# Patient Record
Sex: FEMALE | Race: OTHER | NOT HISPANIC OR LATINO | URBAN - METROPOLITAN AREA
[De-identification: names, ages, dates, MRNs, and addresses within clinical notes are randomized per-mention and may not be internally consistent; named-entity substitution may affect disease eponyms.]

---

## 2017-08-12 ENCOUNTER — EMERGENCY (EMERGENCY)
Facility: HOSPITAL | Age: 57
LOS: 1 days | End: 2017-08-12
Attending: EMERGENCY MEDICINE | Admitting: EMERGENCY MEDICINE
Payer: MEDICAID

## 2017-08-12 VITALS
TEMPERATURE: 98 F | HEART RATE: 83 BPM | OXYGEN SATURATION: 100 % | DIASTOLIC BLOOD PRESSURE: 79 MMHG | SYSTOLIC BLOOD PRESSURE: 137 MMHG | RESPIRATION RATE: 20 BRPM

## 2017-08-12 DIAGNOSIS — Z98.890 OTHER SPECIFIED POSTPROCEDURAL STATES: Chronic | ICD-10-CM

## 2017-08-12 LAB
ALBUMIN SERPL ELPH-MCNC: 4.2 G/DL — SIGNIFICANT CHANGE UP (ref 3.3–5)
ALP SERPL-CCNC: 96 U/L — SIGNIFICANT CHANGE UP (ref 40–120)
ALT FLD-CCNC: 55 U/L RC — HIGH (ref 10–45)
ANION GAP SERPL CALC-SCNC: 16 MMOL/L — SIGNIFICANT CHANGE UP (ref 5–17)
APTT BLD: 31.4 SEC — SIGNIFICANT CHANGE UP (ref 27.5–37.4)
AST SERPL-CCNC: 37 U/L — SIGNIFICANT CHANGE UP (ref 10–40)
BASE EXCESS BLDV CALC-SCNC: 2.3 MMOL/L — HIGH (ref -2–2)
BASOPHILS # BLD AUTO: 0.1 K/UL — SIGNIFICANT CHANGE UP (ref 0–0.2)
BASOPHILS NFR BLD AUTO: 0.7 % — SIGNIFICANT CHANGE UP (ref 0–2)
BILIRUB SERPL-MCNC: 1.3 MG/DL — HIGH (ref 0.2–1.2)
BUN SERPL-MCNC: 10 MG/DL — SIGNIFICANT CHANGE UP (ref 7–23)
CA-I SERPL-SCNC: 1.19 MMOL/L — SIGNIFICANT CHANGE UP (ref 1.12–1.3)
CALCIUM SERPL-MCNC: 9.2 MG/DL — SIGNIFICANT CHANGE UP (ref 8.4–10.5)
CHLORIDE BLDV-SCNC: 104 MMOL/L — SIGNIFICANT CHANGE UP (ref 96–108)
CHLORIDE SERPL-SCNC: 101 MMOL/L — SIGNIFICANT CHANGE UP (ref 96–108)
CO2 BLDV-SCNC: 30 MMOL/L — SIGNIFICANT CHANGE UP (ref 22–30)
CO2 SERPL-SCNC: 25 MMOL/L — SIGNIFICANT CHANGE UP (ref 22–31)
CREAT SERPL-MCNC: 0.75 MG/DL — SIGNIFICANT CHANGE UP (ref 0.5–1.3)
EOSINOPHIL # BLD AUTO: 0.1 K/UL — SIGNIFICANT CHANGE UP (ref 0–0.5)
EOSINOPHIL NFR BLD AUTO: 1 % — SIGNIFICANT CHANGE UP (ref 0–6)
GAS PNL BLDV: 140 MMOL/L — SIGNIFICANT CHANGE UP (ref 136–145)
GAS PNL BLDV: SIGNIFICANT CHANGE UP
GLUCOSE BLDV-MCNC: 94 MG/DL — SIGNIFICANT CHANGE UP (ref 70–99)
GLUCOSE SERPL-MCNC: 101 MG/DL — HIGH (ref 70–99)
HCO3 BLDV-SCNC: 29 MMOL/L — SIGNIFICANT CHANGE UP (ref 21–29)
HCT VFR BLD CALC: 39.7 % — SIGNIFICANT CHANGE UP (ref 34.5–45)
HCT VFR BLDA CALC: 43 % — SIGNIFICANT CHANGE UP (ref 39–50)
HGB BLD CALC-MCNC: 14.2 G/DL — SIGNIFICANT CHANGE UP (ref 11.5–15.5)
HGB BLD-MCNC: 13.7 G/DL — SIGNIFICANT CHANGE UP (ref 11.5–15.5)
INR BLD: 1.16 RATIO — SIGNIFICANT CHANGE UP (ref 0.88–1.16)
LACTATE BLDV-MCNC: 1.7 MMOL/L — SIGNIFICANT CHANGE UP (ref 0.7–2)
LYMPHOCYTES # BLD AUTO: 28.9 % — SIGNIFICANT CHANGE UP (ref 13–44)
LYMPHOCYTES # BLD AUTO: 4 K/UL — HIGH (ref 1–3.3)
MCHC RBC-ENTMCNC: 32.1 PG — SIGNIFICANT CHANGE UP (ref 27–34)
MCHC RBC-ENTMCNC: 34.6 GM/DL — SIGNIFICANT CHANGE UP (ref 32–36)
MCV RBC AUTO: 92.8 FL — SIGNIFICANT CHANGE UP (ref 80–100)
MONOCYTES # BLD AUTO: 1.1 K/UL — HIGH (ref 0–0.9)
MONOCYTES NFR BLD AUTO: 7.8 % — SIGNIFICANT CHANGE UP (ref 2–14)
NEUTROPHILS # BLD AUTO: 8.4 K/UL — HIGH (ref 1.8–7.4)
NEUTROPHILS NFR BLD AUTO: 61.5 % — SIGNIFICANT CHANGE UP (ref 43–77)
OTHER CELLS CSF MANUAL: 6 ML/DL — LOW (ref 18–22)
PCO2 BLDV: 53 MMHG — HIGH (ref 35–50)
PH BLDV: 7.35 — SIGNIFICANT CHANGE UP (ref 7.35–7.45)
PLATELET # BLD AUTO: 260 K/UL — SIGNIFICANT CHANGE UP (ref 150–400)
PO2 BLDV: 22 MMHG — LOW (ref 25–45)
POTASSIUM BLDV-SCNC: 3.4 MMOL/L — LOW (ref 3.5–5)
POTASSIUM SERPL-MCNC: 3.5 MMOL/L — SIGNIFICANT CHANGE UP (ref 3.5–5.3)
POTASSIUM SERPL-SCNC: 3.5 MMOL/L — SIGNIFICANT CHANGE UP (ref 3.5–5.3)
PROT SERPL-MCNC: 8.1 G/DL — SIGNIFICANT CHANGE UP (ref 6–8.3)
PROTHROM AB SERPL-ACNC: 12.7 SEC — SIGNIFICANT CHANGE UP (ref 9.8–12.7)
RBC # BLD: 4.27 M/UL — SIGNIFICANT CHANGE UP (ref 3.8–5.2)
RBC # FLD: 11.5 % — SIGNIFICANT CHANGE UP (ref 10.3–14.5)
SAO2 % BLDV: 30 % — LOW (ref 67–88)
SODIUM SERPL-SCNC: 142 MMOL/L — SIGNIFICANT CHANGE UP (ref 135–145)
WBC # BLD: 13.7 K/UL — HIGH (ref 3.8–10.5)
WBC # FLD AUTO: 13.7 K/UL — HIGH (ref 3.8–10.5)

## 2017-08-12 PROCEDURE — 76642 ULTRASOUND BREAST LIMITED: CPT | Mod: 26,LT

## 2017-08-12 PROCEDURE — 99236 HOSP IP/OBS SAME DATE HI 85: CPT

## 2017-08-12 RX ORDER — ACETAMINOPHEN 500 MG
1000 TABLET ORAL ONCE
Qty: 0 | Refills: 0 | Status: COMPLETED | OUTPATIENT
Start: 2017-08-12 | End: 2017-08-12

## 2017-08-12 RX ORDER — SODIUM CHLORIDE 9 MG/ML
1000 INJECTION INTRAMUSCULAR; INTRAVENOUS; SUBCUTANEOUS ONCE
Qty: 0 | Refills: 0 | Status: COMPLETED | OUTPATIENT
Start: 2017-08-12 | End: 2017-08-12

## 2017-08-12 RX ADMIN — Medication 400 MILLIGRAM(S): at 19:50

## 2017-08-12 RX ADMIN — Medication 100 MILLIGRAM(S): at 19:10

## 2017-08-12 RX ADMIN — Medication 1000 MILLIGRAM(S): at 20:27

## 2017-08-12 RX ADMIN — SODIUM CHLORIDE 1000 MILLILITER(S): 9 INJECTION INTRAMUSCULAR; INTRAVENOUS; SUBCUTANEOUS at 19:10

## 2017-08-12 NOTE — ED PROVIDER NOTE - MEDICAL DECISION MAKING DETAILS
56F hx hypothyroidism, bipolar, breast mass currently worked up presents with left breast redness, pain/pressure and draining wound for 1-2 days s/p biopsy 2 weeks.  Staples removed by oncologist Dr. Gonzalez at [facility] last week.  Subjective fevers, recorded 99F.  No antibiotics.  Denies nausea/vomiting.  VSS, afebrile.  Fluctuance mass at approximiately 2-3 o'clock with open wound draining purulence and surrounding erythema.  cultures, abx, u/s, sx consult.  TBA.

## 2017-08-12 NOTE — ED ADULT NURSE NOTE - CHIEF COMPLAINT
The patient is a 56y Female complaining of see chief complaint quote. The patient is a 56y Female complaining of drainage from breast bx site.

## 2017-08-12 NOTE — ED ADULT NURSE NOTE - OBJECTIVE STATEMENT
Pt had bx done of left breast done 2 weeks ago for further eval of abnormal mammogram results.  She c/o subjective fever last night after breast began to drain purulent material, yellow in color.  Site is located @ 2 o'clock position on the breast.

## 2017-08-12 NOTE — ED PROVIDER NOTE - ATTENDING CONTRIBUTION TO CARE
56F hx hypothyroidism, bipolar, breast mass currently worked up presents with left breast redness, pain/pressure and draining wound for 1-2 days s/p biopsy 2 weeks.  Staples removed by oncologist Dr. Gonzalez at [facility] last week.  Subjective fevers, recorded 99F.  No antibiotics.  Denies nausea/vomiting. 56F hx hypothyroidism, bipolar, breast mass currently worked up presents with left breast redness, pain/pressure and draining wound for 1-2 days s/p biopsy 2 weeks.  Staples removed by oncologist Dr. Gonzalez at [facility] last week.  Subjective fevers, recorded 99F.  No antibiotics.  Denies nausea/vomiting.  VSS, afebrile.  Fluctuance mass at approximiately 2-3 o'clock with open wound draining purulence and surrounding erythema.  cultures, abx, u/s, sx consult.  TBA. 56F hx hypothyroidism, bipolar, breast mass currently worked up presents with left breast redness, pain/pressure and draining wound for 1-2 days s/p biopsy 2 weeks.  Staples removed by oncologist Dr. Gonzalez at [facility] last week.  Subjective fevers, recorded 99F.  No antibiotics.  Denies nausea/vomiting.  VSS, afebrile.  Fluctuance mass at approximiately 2-3 o'clock with open wound draining purulence and surrounding erythema.  cultures, abx, u/s, sx consult.

## 2017-08-12 NOTE — ED PROVIDER NOTE - OBJECTIVE STATEMENT
55 y/o female with hx of hypothyroid and depression with recent left breast biopsy 2 weeks ago at [facility] with Dr. Gonzalez who presents for drainage from the wound. saw the surgeon last week to have sutures removed and it looked good at that time. increasing redness and pain since then. started draining purulent material last night.  went to an urgent care who sent her here. has an apt next week with oncology states" its benign but they want me to take tamoxifen". + subjective fever and chills (tmax 99) and nausea without vomiting.

## 2017-08-12 NOTE — ED PROVIDER NOTE - PHYSICAL EXAMINATION
left breast with incision open at both ends, purulent material actively draining with multiple pockets of fluctuance.

## 2017-08-12 NOTE — ED PROVIDER NOTE - PROGRESS NOTE DETAILS
Surgery to drain abscess and bedside.  Pt will be dispositioned to cdu for continued IV clindamycin, observation.  CDU will arrange expedited follow-up with Dr. Gonzalez of [facility].      Chinmay Aguilera MD

## 2017-08-12 NOTE — ED PROVIDER NOTE - CARE PLAN
Principal Discharge DX:	Breast abscess Principal Discharge DX:	Breast abscess  Secondary Diagnosis:	Cellulitis

## 2017-08-13 VITALS
DIASTOLIC BLOOD PRESSURE: 78 MMHG | TEMPERATURE: 98 F | SYSTOLIC BLOOD PRESSURE: 121 MMHG | RESPIRATION RATE: 16 BRPM | HEART RATE: 70 BPM | OXYGEN SATURATION: 99 %

## 2017-08-13 PROCEDURE — 85014 HEMATOCRIT: CPT

## 2017-08-13 PROCEDURE — 99284 EMERGENCY DEPT VISIT MOD MDM: CPT | Mod: 25

## 2017-08-13 PROCEDURE — 85027 COMPLETE CBC AUTOMATED: CPT

## 2017-08-13 PROCEDURE — 96376 TX/PRO/DX INJ SAME DRUG ADON: CPT | Mod: XU

## 2017-08-13 PROCEDURE — 83605 ASSAY OF LACTIC ACID: CPT

## 2017-08-13 PROCEDURE — 87070 CULTURE OTHR SPECIMN AEROBIC: CPT

## 2017-08-13 PROCEDURE — 96374 THER/PROPH/DIAG INJ IV PUSH: CPT | Mod: XU

## 2017-08-13 PROCEDURE — 96375 TX/PRO/DX INJ NEW DRUG ADDON: CPT | Mod: XU

## 2017-08-13 PROCEDURE — 82947 ASSAY GLUCOSE BLOOD QUANT: CPT

## 2017-08-13 PROCEDURE — 10061 I&D ABSCESS COMP/MULTIPLE: CPT

## 2017-08-13 PROCEDURE — 85610 PROTHROMBIN TIME: CPT

## 2017-08-13 PROCEDURE — 87040 BLOOD CULTURE FOR BACTERIA: CPT

## 2017-08-13 PROCEDURE — G0378: CPT

## 2017-08-13 PROCEDURE — 84295 ASSAY OF SERUM SODIUM: CPT

## 2017-08-13 PROCEDURE — 82803 BLOOD GASES ANY COMBINATION: CPT

## 2017-08-13 PROCEDURE — 80053 COMPREHEN METABOLIC PANEL: CPT

## 2017-08-13 PROCEDURE — 85730 THROMBOPLASTIN TIME PARTIAL: CPT

## 2017-08-13 PROCEDURE — 82330 ASSAY OF CALCIUM: CPT

## 2017-08-13 PROCEDURE — 84132 ASSAY OF SERUM POTASSIUM: CPT

## 2017-08-13 PROCEDURE — 82435 ASSAY OF BLOOD CHLORIDE: CPT

## 2017-08-13 PROCEDURE — 76642 ULTRASOUND BREAST LIMITED: CPT

## 2017-08-13 PROCEDURE — 87186 SC STD MICRODIL/AGAR DIL: CPT

## 2017-08-13 RX ORDER — ACETAMINOPHEN 500 MG
650 TABLET ORAL ONCE
Qty: 0 | Refills: 0 | Status: COMPLETED | OUTPATIENT
Start: 2017-08-13 | End: 2017-08-13

## 2017-08-13 RX ORDER — LEVOTHYROXINE SODIUM 125 MCG
100 TABLET ORAL DAILY
Qty: 0 | Refills: 0 | Status: DISCONTINUED | OUTPATIENT
Start: 2017-08-13 | End: 2017-08-16

## 2017-08-13 RX ADMIN — Medication 100 MILLIGRAM(S): at 11:27

## 2017-08-13 RX ADMIN — Medication 650 MILLIGRAM(S): at 08:31

## 2017-08-13 RX ADMIN — Medication 100 MILLIGRAM(S): at 03:20

## 2017-08-13 RX ADMIN — Medication 100 MICROGRAM(S): at 05:22

## 2017-08-13 NOTE — ED CDU PROVIDER NOTE - SKIN WOUND TYPE
abscess(s)/lateral L breast with 4cm horizontal incision packed s/p I&D, minimal drainage and surrounding erythema/INCISION(S) abscess(s)/INCISION(S)/lateral L breast with 4cm horizontal incision, wound packed s/p I&D, minimal drainage noted, + surrounding warmth and erythema

## 2017-08-13 NOTE — ED CDU PROVIDER NOTE - ATTENDING CONTRIBUTION TO CARE
I saw and evaluated patient with ACP. I discussed H+P and MDM with ACP. I agree with the statements made by the ACP unless otherwise noted.

## 2017-08-13 NOTE — ED CDU PROVIDER NOTE - PLAN OF CARE
Stay well hydrated. Change wound packing at least once daily.  You may undress wound to shower and let water run over the wound.   Take Motrin 600mg every 8 hours for pain as needed (take with food). May alternate with Tylenol 650mg every 6-8 hours for pain as needed.   Take clindamycin 300mg every 6 hours for 7 days.   Follow up with your surgeon, Dr. Gonzalez, within 24-48 hours. If you can not follow up, please return to ER within 48 hours for wound check.  Return to ER for worsening pain/swelling, redness, purulent drainage, fever/chills, or any other concerns.

## 2017-08-13 NOTE — ED CDU PROVIDER NOTE - PROGRESS NOTE DETAILS
Patient resting comfortably. Reports pain is controlled at this time. VSS. Pt's two daughter's at bedside confirm that they were told how to re-pack wound and change dressing at least once daily. Understand pt is to f/u with her surgeon and if not within 48 hours, must return to ER for wound check. - Jagruti Munoz PA-C LAURENT Arenas MD: 56F hx hypothyroidism, bipolar, breast mass currently worked up presented last night with left breast redness, pain/pressure and draining wound for 1-2 days s/p biopsy performed 2 weeks ago.  Staples removed by oncologist Dr. Gonzalez at [facility] last week. + Subjective fevers, recorded 99F.  No ABX use. Pt found to have fluctuant mass at approximiately 2-3 o'clock with open wound draining purulence and surrounding erythema. Labs showed WBC 13.7. US demonstrated a fluid collection in the breast concerning for an abscess. Pt is s/p an I+D by Surgery last night in the ED. Per sign-out, pt is to f/u with her Surgeon, Dr. Gonzalez, as outpt. Pt admitted to CDU for IV ABX, further evaluation. Pt is s/p 2 doses of clindamycin. Patient resting in bed comfortably. No distress, no complaints. Vital Signs Stable.  -Kilo George PA-C LAURENT Arenas MD: 56F hx hypothyroidism, bipolar, breast mass currently worked up presented last night with left breast redness, pain/pressure and draining wound for 1-2 days s/p biopsy performed 2 weeks ago.  Staples removed by oncologist Dr. Gonzalez at [facility] last week. + Subjective fevers, recorded 99F.  No ABX use. Pt found to have fluctuant mass at approximiately 2-3 o'clock with open wound draining purulence and surrounding erythema. Labs showed WBC 13.7. US demonstrated a fluid collection in the breast concerning for an abscess. Pt is s/p an I+D by Surgery last night in the ED. Per sign-out, pt is to f/u with her Surgeon, Dr. Gonzalez, as outpt. Pt admitted to CDU for IV ABX, further evaluation. Pt is s/p 2 doses of clindamycin. Exam demonstrates 3cm L breast incision with + packing in place, +3cm diameter area of erythema surrounding it. Per patient, she feels well, pain Is well-controlled by tylenol. Will have surgery re-pack abscess prior to d/c. She will receive 3rd dose of IV clindamycin prior to d/c. Pt knows to f/u with her breast surgeon next week. Will d/c with 1 week of clindamycin and lactobacillus. Patient re-evaluated and doing well.  No acute issues at  this time.  Lab and radiology tests reviewed with patient and/or family.  Patient stable for discharge.

## 2017-08-13 NOTE — ED CDU PROVIDER NOTE - MEDICAL DECISION MAKING DETAILS
56F hx hypothyroidism, bipolar, breast mass currently worked up presents with left breast redness, pain/pressure and draining wound for 1-2 days s/p biopsy 2 weeks. VSS.  subjective fever, L breast abscess on U/S s/p sx I&D at bedside.  CDU for continued IV Clindamycin, observation and arrangement of expedited f/u with Dr. Gonzalez (patient's sx).    Chinmay Aguilera MD

## 2017-08-13 NOTE — ED CDU PROVIDER NOTE - OBJECTIVE STATEMENT
55 y/o female with hx of hypothyroid and depression with recent left breast biopsy 2 weeks ago at [facility] with Dr. Gonzalez who presents for drainage from the wound. saw the surgeon last week to have sutures removed and it looked good at that time. increasing redness and pain since then. started draining purulent material last night.  went to an urgent care who sent her here. has an apt next week with oncology states" its benign but they want me to take tamoxifen". + subjective fever and chills (tmax 99) and nausea without vomiting.  In ED, I&D of abscess performed by surgery. Will admit to CDU for IV abx, pain control, and monitoring 57 y/o female with hx of hypothyroid and depression with recent left breast biopsy 2 weeks ago at [facility] with Dr. Gonzalez who presents for drainage from the wound. saw the surgeon last week to have sutures removed and it looked good at that time. increasing redness and pain since then. started draining purulent material last night.  went to an urgent care who sent her here. has an apt next week with oncology states" its benign but they want me to take tamoxifen". + subjective fever and chills (tmax 99) and nausea without vomiting.  In ED, I&D of abscess performed by surgery. Will admit to CDU for IV abx, pain control, and monitoring.

## 2017-08-13 NOTE — CONSULT NOTE ADULT - ASSESSMENT
Patient is a 55yo F s/p bedside I&D of L breast abscess that presented 2 weeks after an excisional bx with outside surgeon.    -Discharge home with 7 day course of antibiotics  -Patient is to follow up with her Surgeon on Monday or come to the ED for assessment of wound.    -Daughters to change the packing at least once a day.  Patient instructed to shower and let water run over the wound.  Patient also told to come back to the ED if wound becomes more erythematous, more purulent, or she develops fevers and chills  -Discussed with Dr. Amish Waller MD PGYII

## 2017-08-13 NOTE — CONSULT NOTE ADULT - SUBJECTIVE AND OBJECTIVE BOX
GENERAL SURGERY CONSULT NOTE     CC: 56y old Female admitted with a chief complaint of L breast pain and purulent drainage    HPI: This patient is a 56y old Female who, 2 wks ago, has a mammo loc excisional biopsy at OSH.  Patient has had pain swelling and skin redness at the incision with purulent drainage spontaneously from the lateral border of her incision.      U/S confirmed that there was a < from: US Breast Limited, Left (08.12.17 @ 18:18) >   7.3 x 2.9 x 4.3 cm ill-defined fluid collection in the left breast with peripheral hyperemia.      Procedure Proceedings:  Dr. Gio Ga and Dr. Yuliya Waller obtained consent for the incision and drainage of breast abscess.  Patient was prepped with Chlorhexidine and anesthetized with 20cc of 1% Lidocaine with Epinephrine.  sharp dissection of incision and underlying Vicryl sutures at the lateral 1/4 of the incision.  In total, about 50cc of purulent drainage was expressed.  Medial incision opened with expression of pus, so the entire incision length was extended.  Patient's daughters were taught how to pack the wound and were explained the importance.  The wound was propperly dressed and packed after irrigation with 20cc of sterile water.          PMHx/PSHx:  Breast bx  hypothyroidism  Depression      Medications (home): Medications (inpatient): clindamycin IVPB 600 milliGRAM(s) IV Intermittent once  clindamycin IVPB 600 milliGRAM(s) IV Intermittent every 8 hours  levothyroxine 100 MICROGram(s) Oral daily    Medications (PRN):  Allergies    No Known Allergies    Intolerances      Social Hx:     Physical Exam  T(C): 36.8 (08-13-17 @ 02:09), Max: 36.9 (08-12-17 @ 15:20)  HR: 77 (08-13-17 @ 02:09) (73 - 83)  BP: 117/80 (08-13-17 @ 02:09) (113/79 - 137/79)  RR: 17 (08-13-17 @ 02:09) (17 - 20)  SpO2: 95% (08-13-17 @ 02:09) (95% - 100%)  Wt(kg): --  Tmax: T(C): , Max: 36.9 (08-12-17 @ 15:20)  Wt(kg): --    08-12-17  -  08-13-17  --------------------------------------------------------  IN:    IV PiggyBack: 200 mL    Sodium Chloride 0.9% IV Bolus: 1000 mL  Total IN: 1200 mL    OUT:  Total OUT: 0 mL    Total NET: 1200 mL        General: NAD  Neuro: alert and oriented, no focal deficits, moves all extremities spontaneously  HEENT: NCAT, EOMI, anicteric, mucosa moist  Respiratory: airway patent, respirations unlabored  L breast, incision with erythematous skin edges, warm to the touch, tender, with purulent drainage at later aspect where most fluctuance was appreciated  Extremities: no edema, sensation and movement grossly intact  Skin: warm, dry, appropriate color    Labs:                        13.7   13.7  )-----------( 260      ( 12 Aug 2017 17:56 )             39.7     PT/INR - ( 12 Aug 2017 17:56 )   PT: 12.7 sec;   INR: 1.16 ratio         PTT - ( 12 Aug 2017 17:56 )  PTT:31.4 sec  08-12    142  |  101  |  10  ----------------------------<  101<H>  3.5   |  25  |  0.75    Ca    9.2      12 Aug 2017 17:56    TPro  8.1  /  Alb  4.2  /  TBili  1.3<H>  /  DBili  x   /  AST  37  /  ALT  55<H>  /  AlkPhos  96  08-12

## 2017-08-13 NOTE — ED ADULT NURSE REASSESSMENT NOTE - NS ED NURSE REASSESS COMMENT FT1
report taken from Corry ZIMMERMAN
Received pt from ELSIE Fowler , received pt alert and responsive, oriented x4, denies any respiratory distress, SOB, or difficulty breathing. Pt transferred to CDU for observation for L breast abscess, cellulitis,+ redness, swelling to L breast post breast biopsy 2 weeks ago, pt denies pain at this time, pending clindamycin 600mg IV at 0300. IV in place, patent and free of signs of infiltration, V/S stable, pt afebrile, Pt educated on unit and unit rules, instructed patient to notify RN of any needed assistance, Pt verbalizes understanding, Call bell placed within reach. Safety maintained. Will continue to monitor. Daughters at bedside.

## 2017-08-14 LAB
-  AMIKACIN: SIGNIFICANT CHANGE UP
-  AMPICILLIN/SULBACTAM: SIGNIFICANT CHANGE UP
-  AMPICILLIN: SIGNIFICANT CHANGE UP
-  AZTREONAM: SIGNIFICANT CHANGE UP
-  CEFAZOLIN: SIGNIFICANT CHANGE UP
-  CEFEPIME: SIGNIFICANT CHANGE UP
-  CEFOXITIN: SIGNIFICANT CHANGE UP
-  CEFTAZIDIME: SIGNIFICANT CHANGE UP
-  CEFTRIAXONE: SIGNIFICANT CHANGE UP
-  CIPROFLOXACIN: SIGNIFICANT CHANGE UP
-  ERTAPENEM: SIGNIFICANT CHANGE UP
-  GENTAMICIN: SIGNIFICANT CHANGE UP
-  IMIPENEM: SIGNIFICANT CHANGE UP
-  LEVOFLOXACIN: SIGNIFICANT CHANGE UP
-  MEROPENEM: SIGNIFICANT CHANGE UP
-  PIPERACILLIN/TAZOBACTAM: SIGNIFICANT CHANGE UP
-  TOBRAMYCIN: SIGNIFICANT CHANGE UP
-  TRIMETHOPRIM/SULFAMETHOXAZOLE: SIGNIFICANT CHANGE UP
CULTURE RESULTS: SIGNIFICANT CHANGE UP
METHOD TYPE: SIGNIFICANT CHANGE UP
ORGANISM # SPEC MICROSCOPIC CNT: SIGNIFICANT CHANGE UP
ORGANISM # SPEC MICROSCOPIC CNT: SIGNIFICANT CHANGE UP
SPECIMEN SOURCE: SIGNIFICANT CHANGE UP

## 2017-08-15 NOTE — ED POST DISCHARGE NOTE - OTHER COMMUNICATION
Patient called back, informed of culture results and sent rx for Bactrim to patients pharmacy. Cali Merlos PA-C.

## 2017-08-15 NOTE — ED POST DISCHARGE NOTE - DETAILS
KALIN left for callback -Tammy ZAPATA VM left for callback - Eber ALDANA KALIN left for callback -Slowey DO 8/17

## 2017-08-17 LAB
CULTURE RESULTS: SIGNIFICANT CHANGE UP
CULTURE RESULTS: SIGNIFICANT CHANGE UP
SPECIMEN SOURCE: SIGNIFICANT CHANGE UP
SPECIMEN SOURCE: SIGNIFICANT CHANGE UP

## 2017-08-17 RX ORDER — AZTREONAM 2 G
1 VIAL (EA) INJECTION
Qty: 20 | Refills: 0 | OUTPATIENT
Start: 2017-08-17 | End: 2017-08-27

## 2018-02-18 ENCOUNTER — INPATIENT (INPATIENT)
Facility: HOSPITAL | Age: 58
LOS: 23 days | Discharge: HOME | End: 2018-03-14
Attending: PSYCHIATRY & NEUROLOGY | Admitting: PSYCHIATRY & NEUROLOGY
Payer: MEDICAID

## 2018-02-18 VITALS
SYSTOLIC BLOOD PRESSURE: 124 MMHG | TEMPERATURE: 99 F | HEART RATE: 120 BPM | OXYGEN SATURATION: 100 % | RESPIRATION RATE: 16 BRPM | DIASTOLIC BLOOD PRESSURE: 67 MMHG

## 2018-02-18 DIAGNOSIS — F31.9 BIPOLAR DISORDER, UNSPECIFIED: ICD-10-CM

## 2018-02-18 DIAGNOSIS — Z98.890 OTHER SPECIFIED POSTPROCEDURAL STATES: Chronic | ICD-10-CM

## 2018-02-18 DIAGNOSIS — F31.60 BIPOLAR DISORDER, CURRENT EPISODE MIXED, UNSPECIFIED: ICD-10-CM

## 2018-02-18 LAB
ALBUMIN SERPL ELPH-MCNC: 4.3 G/DL — SIGNIFICANT CHANGE UP (ref 3.3–5)
ALP SERPL-CCNC: 68 U/L — SIGNIFICANT CHANGE UP (ref 40–120)
ALT FLD-CCNC: 52 U/L — HIGH (ref 4–33)
APAP SERPL-MCNC: < 15 UG/ML — LOW (ref 15–25)
AST SERPL-CCNC: 80 U/L — HIGH (ref 4–32)
BARBITURATES MEASUREMENT: NEGATIVE — SIGNIFICANT CHANGE UP
BASOPHILS # BLD AUTO: 0.05 K/UL — SIGNIFICANT CHANGE UP (ref 0–0.2)
BASOPHILS NFR BLD AUTO: 0.4 % — SIGNIFICANT CHANGE UP (ref 0–2)
BENZODIAZ SERPL-MCNC: NEGATIVE — SIGNIFICANT CHANGE UP
BILIRUB SERPL-MCNC: 0.7 MG/DL — SIGNIFICANT CHANGE UP (ref 0.2–1.2)
BUN SERPL-MCNC: 13 MG/DL — SIGNIFICANT CHANGE UP (ref 7–23)
CALCIUM SERPL-MCNC: 8.7 MG/DL — SIGNIFICANT CHANGE UP (ref 8.4–10.5)
CHLORIDE SERPL-SCNC: 104 MMOL/L — SIGNIFICANT CHANGE UP (ref 98–107)
CO2 SERPL-SCNC: 24 MMOL/L — SIGNIFICANT CHANGE UP (ref 22–31)
CREAT SERPL-MCNC: 0.75 MG/DL — SIGNIFICANT CHANGE UP (ref 0.5–1.3)
EOSINOPHIL # BLD AUTO: 0.11 K/UL — SIGNIFICANT CHANGE UP (ref 0–0.5)
EOSINOPHIL NFR BLD AUTO: 0.9 % — SIGNIFICANT CHANGE UP (ref 0–6)
ETHANOL BLD-MCNC: < 10 MG/DL — SIGNIFICANT CHANGE UP
GLUCOSE SERPL-MCNC: 97 MG/DL — SIGNIFICANT CHANGE UP (ref 70–99)
HCT VFR BLD CALC: 35.2 % — SIGNIFICANT CHANGE UP (ref 34.5–45)
HGB BLD-MCNC: 11.5 G/DL — SIGNIFICANT CHANGE UP (ref 11.5–15.5)
IMM GRANULOCYTES # BLD AUTO: 0.04 # — SIGNIFICANT CHANGE UP
IMM GRANULOCYTES NFR BLD AUTO: 0.3 % — SIGNIFICANT CHANGE UP (ref 0–1.5)
LYMPHOCYTES # BLD AUTO: 33.9 % — SIGNIFICANT CHANGE UP (ref 13–44)
LYMPHOCYTES # BLD AUTO: 4.2 K/UL — HIGH (ref 1–3.3)
MCHC RBC-ENTMCNC: 29.8 PG — SIGNIFICANT CHANGE UP (ref 27–34)
MCHC RBC-ENTMCNC: 32.7 % — SIGNIFICANT CHANGE UP (ref 32–36)
MCV RBC AUTO: 91.2 FL — SIGNIFICANT CHANGE UP (ref 80–100)
MONOCYTES # BLD AUTO: 0.87 K/UL — SIGNIFICANT CHANGE UP (ref 0–0.9)
MONOCYTES NFR BLD AUTO: 7 % — SIGNIFICANT CHANGE UP (ref 2–14)
NEUTROPHILS # BLD AUTO: 7.11 K/UL — SIGNIFICANT CHANGE UP (ref 1.8–7.4)
NEUTROPHILS NFR BLD AUTO: 57.5 % — SIGNIFICANT CHANGE UP (ref 43–77)
NRBC # FLD: 0 — SIGNIFICANT CHANGE UP
PLATELET # BLD AUTO: 285 K/UL — SIGNIFICANT CHANGE UP (ref 150–400)
PMV BLD: 10 FL — SIGNIFICANT CHANGE UP (ref 7–13)
POTASSIUM SERPL-MCNC: 3.6 MMOL/L — SIGNIFICANT CHANGE UP (ref 3.5–5.3)
POTASSIUM SERPL-SCNC: 3.6 MMOL/L — SIGNIFICANT CHANGE UP (ref 3.5–5.3)
PROT SERPL-MCNC: 7.4 G/DL — SIGNIFICANT CHANGE UP (ref 6–8.3)
RBC # BLD: 3.86 M/UL — SIGNIFICANT CHANGE UP (ref 3.8–5.2)
RBC # FLD: 12.4 % — SIGNIFICANT CHANGE UP (ref 10.3–14.5)
SALICYLATES SERPL-MCNC: < 5 MG/DL — LOW (ref 15–30)
SODIUM SERPL-SCNC: 142 MMOL/L — SIGNIFICANT CHANGE UP (ref 135–145)
TSH SERPL-MCNC: 1.26 UIU/ML — SIGNIFICANT CHANGE UP (ref 0.27–4.2)
WBC # BLD: 12.38 K/UL — HIGH (ref 3.8–10.5)
WBC # FLD AUTO: 12.38 K/UL — HIGH (ref 3.8–10.5)

## 2018-02-18 PROCEDURE — 70450 CT HEAD/BRAIN W/O DYE: CPT | Mod: 26

## 2018-02-18 PROCEDURE — 99285 EMERGENCY DEPT VISIT HI MDM: CPT

## 2018-02-18 RX ORDER — ASPIRIN/CALCIUM CARB/MAGNESIUM 324 MG
81 TABLET ORAL DAILY
Qty: 0 | Refills: 0 | Status: DISCONTINUED | OUTPATIENT
Start: 2018-02-18 | End: 2018-03-14

## 2018-02-18 RX ORDER — BENZTROPINE MESYLATE 1 MG
0.5 TABLET ORAL AT BEDTIME
Qty: 0 | Refills: 0 | Status: DISCONTINUED | OUTPATIENT
Start: 2018-02-18 | End: 2018-02-23

## 2018-02-18 RX ORDER — BENZOCAINE AND MENTHOL 5; 1 G/100ML; G/100ML
1 LIQUID ORAL
Qty: 0 | Refills: 0 | Status: DISCONTINUED | OUTPATIENT
Start: 2018-02-18 | End: 2018-03-14

## 2018-02-18 RX ORDER — ACETAMINOPHEN 500 MG
650 TABLET ORAL ONCE
Qty: 0 | Refills: 0 | Status: COMPLETED | OUTPATIENT
Start: 2018-02-18 | End: 2018-02-18

## 2018-02-18 RX ORDER — HALOPERIDOL DECANOATE 100 MG/ML
2 INJECTION INTRAMUSCULAR EVERY 6 HOURS
Qty: 0 | Refills: 0 | Status: DISCONTINUED | OUTPATIENT
Start: 2018-02-18 | End: 2018-03-14

## 2018-02-18 RX ORDER — RISPERIDONE 4 MG/1
2 TABLET ORAL AT BEDTIME
Qty: 0 | Refills: 0 | Status: DISCONTINUED | OUTPATIENT
Start: 2018-02-18 | End: 2018-02-22

## 2018-02-18 RX ORDER — HALOPERIDOL DECANOATE 100 MG/ML
2 INJECTION INTRAMUSCULAR ONCE
Qty: 0 | Refills: 0 | Status: DISCONTINUED | OUTPATIENT
Start: 2018-02-18 | End: 2018-03-14

## 2018-02-18 RX ORDER — LEVOTHYROXINE SODIUM 125 MCG
75 TABLET ORAL DAILY
Qty: 0 | Refills: 0 | Status: DISCONTINUED | OUTPATIENT
Start: 2018-02-18 | End: 2018-03-14

## 2018-02-18 RX ADMIN — Medication 650 MILLIGRAM(S): at 22:54

## 2018-02-18 RX ADMIN — Medication 650 MILLIGRAM(S): at 21:54

## 2018-02-18 RX ADMIN — Medication 2 MILLIGRAM(S): at 16:19

## 2018-02-18 RX ADMIN — BENZOCAINE AND MENTHOL 1 LOZENGE: 5; 1 LIQUID ORAL at 21:54

## 2018-02-18 NOTE — ED BEHAVIORAL HEALTH ASSESSMENT NOTE - SUMMARY
56 y/o f english speaking, domiciled, unemployed with PPHx of Bipolar non-compliant with risperidone + Valproic Acid, 2 past admission last around 2015, no substance abuse PMHx of Hypothyroidism, Breast Biopsy that was BIB family for 2 days insomnia, pressured speech, disorganized thinking and irritability.   The patient's affect is not congruent to content and is complicated by psychosis and some degrees of alteration of alteration of sensorium but without waxing and waning.  The patient has no insight into her state of mind, and provides inconstit history, but based on collateral it is highly likely she is in a bipolar mix or manic state secondary to medication non-compliance. The precipitating events and time course appear appropriate for a psychiatric cause and are not solely attributable to medical cause but there is low concern for metastatic cancer or UTI.  They do appear to internally preoccupied, and has significant cognitive impairment or have a delusion that would limit their ability to care of self.   The patient does not have insight into this and will require to be hospitalized involuntarily.   They do not have the capacity to make decision about their discharge from the hospital, which is unlikely to change within the next few hours. 56 y/o f english speaking, domiciled, unemployed with PPHx of Bipolar non-compliant with risperidone + Valproic Acid, 2 past admission last around 2015, no substance abuse PMHx of Hypothyroidism, Breast Biopsy that was BIB family for 2 days insomnia, pressured speech, disorganized thinking and irritability.   The patient's affect is not congruent to content and is complicated by psychosis and some degrees of alteration of alteration of sensorium but without waxing and waning.  The patient has no insight into her state of mind, and provides minimal history but has delusional content + disorganized thinking, and grandiosity, and combined with collateral it is highly likely she is in a bipolar mix or manic state secondary to medication non-compliance from bipolar disorder. The precipitating events and time course appear appropriate for a psychiatric cause and are not solely attributable to medical cause but there is low concern for metastatic cancer or UTI.  They do appear to internally preoccupied, and has significant cognitive impairment or have a delusions that would limit their ability to care of self.   The patient does not have insight into this and will require to be hospitalized involuntarily.   They do not have the capacity to make decision about their discharge from the hospital, which is unlikely to change within the next few hours.

## 2018-02-18 NOTE — ED PROVIDER NOTE - OBJECTIVE STATEMENT
56 y/o F hx Bipolar, Breast Cancer, GERD, Hypothyroid    BIB family w c/o agitation and bizarre behaviour secondary to medication non compliance . Denies SI/HI/AH/VH.  Denies falling,  punching or kicking any objects. Denies pain, SOB, fever, chills, chest/abdominal discomfort. Denies use of alcohol or illicit drugs.

## 2018-02-18 NOTE — ED BEHAVIORAL HEALTH ASSESSMENT NOTE - AFFECT RANGE
GENERAL: + chills, EYES: no change in vision, HEENT: no trouble swallowing or speaking, CARDIAC: no chest pain, PULMONARY: no cough or SOB, GI: +abdominal pain, no nausea or no vomiting, +constipation, : No changes in urination, SKIN: no rashes, NEURO: no headache,   otherwise as HPI or negative Labile

## 2018-02-18 NOTE — ED BEHAVIORAL HEALTH ASSESSMENT NOTE - OTHER PAST PSYCHIATRIC HISTORY (INCLUDE DETAILS REGARDING ONSET, COURSE OF ILLNESS, INPATIENT/OUTPATIENT TREATMENT)
Patient kimber. No SA, two admission, last 2015. History of Geodon, and inactive outpatient treatment at [facility].

## 2018-02-18 NOTE — ED ADULT TRIAGE NOTE - CHIEF COMPLAINT QUOTE
as per daughter, pt has hx bipolar and having a manic episode. non complaint with meds. denies any HI/SI

## 2018-02-18 NOTE — ED BEHAVIORAL HEALTH ASSESSMENT NOTE - CASE SUMMARY
58 y/o f english speaking, domiciled, unemployed with PPHx of Bipolar non-compliant with risperidone + Valproic Acid, 2 past admission last around 2015, no substance abuse PMHx of Hypothyroidism, Breast Biopsy that was BIB family for 2 days insomnia, pressured speech, disorganized thinking and irritability.   The patient's affect is not congruent to content and is complicated by psychosis and some degrees of alteration of alteration of sensorium but without waxing and waning.  The patient has no insight into her state of mind, and provides minimal history but has delusional content + disorganized thinking, and grandiosity, and combined with collateral it is highly likely she is in a bipolar mix or manic state secondary to medication non-compliance from bipolar disorder. The precipitating events and time course appear appropriate for a psychiatric cause and are not solely attributable to medical cause but there is low concern for metastatic cancer or UTI.  They do appear to internally preoccupied, and has significant cognitive impairment or have a delusions that would limit their ability to care of self.   The patient does not have insight into this and will require to be hospitalized involuntarily.   They do not have the capacity to make decision about their discharge from the hospital, which is unlikely to change within the next few hours.  Agree with assessment and plan as outline above. Pt will require inpatient psychiatric admission for safety and stabilization.
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
## 2018-02-18 NOTE — ED BEHAVIORAL HEALTH ASSESSMENT NOTE - HPI (INCLUDE ILLNESS QUALITY, SEVERITY, DURATION, TIMING, CONTEXT, MODIFYING FACTORS, ASSOCIATED SIGNS AND SYMPTOMS)
58 y/o f english speaking, domiciled, unemployed with PPHx of Bipolar non-compliant with risperidone + Valproic Acid, 2 past admission last around 2015, no substance abuse PMHx of Hypothyroidism, Breast Biopsy that was BIB family for 2 days insomnia, pressured speech, disorganized thinking and irritability.      History obtained from patient which appears unreliable, and EMR records.  Patient unknown to writer.   No CVM records.  Istop reviewed with reference #: [id number] no rx found.    The patient reports “I am from [facility]”, she repeats I have four children and explains that her daughter in [place] and is a MD, but collateral confirms she is a .  She reports “I brought my son here” and kmiber needing any care.  She kimber having a problem with sleep, and reports “I sleep just fine”.   She is often refuses to answer questions.      No suicidal ideals/intention/plan.   No audio/visual/tactile/olfactory/gustatory hallucinations.   No homicidal ideals.     Per collateral from family (son and daughter) patient has a history of Bipolar and has had two prior admission, 1st about 1 years ago and one 2015.  He has been non-compliant with medications and is followed at [facility] for psychiatric care by  .  Over the last months or so she stopped taking medications, and has been spending $500 at the dollar store. Over the last two days, she hasn’t been sleeping, packing, irritably. The family is hoping to have the patient admitted. 58 y/o f english speaking, domiciled, unemployed with PPHx of Bipolar non-compliant with risperidone + Valproic Acid, 2 past admissions last around 2015, no substance abuse PMHx of Hypothyroidism, Breast Biopsy that was BIB family for 2 days of insomnia, pressured speech, disorganized thinking and irritability.      History obtained from patient which appears unreliable, and EMR records.  Patient unknown to writer.   No CVM records.  Istop reviewed with reference #: [id number] no rx found.    The patient reports “I am from [facility]”, she repeats I have four children and explains that her daughter in [place] and is a MD, but collateral confirms she is a .  She reports “I brought my son here” and kimber needing any care.  She kimber having a problem with sleep, and reports “I sleep just fine”.   She is often refuses to answer questions.     No suicidal ideals/intention/plan.   No audio/visual/tactile/olfactory/gustatory hallucinations.   No homicidal ideals.     Per collateral from family (son and daughter) patient has a history of Bipolar and has had two prior admission, 1st about 10 years ago and one in 2015. Recently lost her  two years ago. He has been non-compliant with medications for about 1 month and is followed at [facility] for psychiatric care by Dr. Cooper.  Over the last months or so she has been refusing medications, and has been spending $500 at the dollar store, and hoarding purchases at home. Over the last two days, she hasn’t been sleeping, packing, irritably. The family is hoping to have the patient admitted.  At baseline is mildly depressed.

## 2018-02-18 NOTE — ED BEHAVIORAL HEALTH ASSESSMENT NOTE - CURRENT MEDICATION
Tamoxifen  Levothyroxine risperidone 3mg QHS  valproic acid 750 mg daily  tamoxifen 20mg daily   synthroid 75 mcg daily   cogentin 0.5mg qhs

## 2018-02-18 NOTE — ED BEHAVIORAL HEALTH ASSESSMENT NOTE - DESCRIPTION
Hypothyroid, Brest Cancer with resection Has four children, unable to provide much more details.  States she is Latter-day, and agynes and reports being a ventraininari. In ED patient’s vitals were stable, , EKG QTc 469. Hypothyroidism, Brest biopsy with concern for cancern Has four children, unable to provide much more details.  States she is [employer], and Gyanness and reports being a .

## 2018-02-18 NOTE — ED BEHAVIORAL HEALTH ASSESSMENT NOTE - PSYCHIATRIC ISSUES AND PLAN (INCLUDE STANDING AND PRN MEDICATION)
Involuntary Admission under 9.39 to Low 4; Admission orders; No SI/HI therefore no constant observation (1:1) for safety; PRN agitation medications; CTH w/o contrast and medical clearance prior to admission. Involuntary Admission under 9.39 to Low 4; Admission orders; No SI/HI therefore no constant observation (1:1) for safety; PRN agitation medications; CTH w/o contrast and medical clearance prior to admission; hold valproic secondary to mild transaminitis, slow titration for risperidone secondary to elevated QTc.

## 2018-02-18 NOTE — ED BEHAVIORAL HEALTH ASSESSMENT NOTE - SUICIDE RISK FACTORS
Mood episode/Command hallucinations to hurt self Mood episode/Command hallucinations to hurt self/Highly impulsive behavior

## 2018-02-18 NOTE — ED PROVIDER NOTE - MEDICAL DECISION MAKING DETAILS
58 y/o F hx Bipolar, Breast Cancer, GERD, Hypothyroid   Labs, Urine Tox/UA, EKG. CT head r/o mets  Medical evaluation performed. There is no clinical evidence of intoxication or any acute medical problem requiring immediate intervention. Patient is awaiting psychiatric consultation. Final disposition will be determined by psychiatrist.

## 2018-02-19 LAB
APPEARANCE UR: CLEAR — SIGNIFICANT CHANGE UP
BACTERIA # UR AUTO: SIGNIFICANT CHANGE UP
BILIRUB UR-MCNC: NEGATIVE — SIGNIFICANT CHANGE UP
BLOOD UR QL VISUAL: NEGATIVE — SIGNIFICANT CHANGE UP
CHOLEST SERPL-MCNC: 206 MG/DL — HIGH (ref 120–199)
COLOR SPEC: YELLOW — SIGNIFICANT CHANGE UP
GLUCOSE UR-MCNC: NEGATIVE — SIGNIFICANT CHANGE UP
HDLC SERPL-MCNC: 87 MG/DL — HIGH (ref 45–65)
HYALINE CASTS # UR AUTO: SIGNIFICANT CHANGE UP (ref 0–?)
KETONES UR-MCNC: NEGATIVE — SIGNIFICANT CHANGE UP
LEUKOCYTE ESTERASE UR-ACNC: HIGH
LIPID PNL WITH DIRECT LDL SERPL: 112 MG/DL — SIGNIFICANT CHANGE UP
MUCOUS THREADS # UR AUTO: SIGNIFICANT CHANGE UP
NITRITE UR-MCNC: NEGATIVE — SIGNIFICANT CHANGE UP
NON-SQ EPI CELLS # UR AUTO: <1 — SIGNIFICANT CHANGE UP
PH UR: 6.5 — SIGNIFICANT CHANGE UP (ref 4.6–8)
PROT UR-MCNC: 20 MG/DL — SIGNIFICANT CHANGE UP
RBC CASTS # UR COMP ASSIST: SIGNIFICANT CHANGE UP (ref 0–?)
SP GR SPEC: 1.03 — SIGNIFICANT CHANGE UP (ref 1–1.04)
SQUAMOUS # UR AUTO: SIGNIFICANT CHANGE UP
TRIGL SERPL-MCNC: 57 MG/DL — SIGNIFICANT CHANGE UP (ref 10–149)
UROBILINOGEN FLD QL: 8 MG/DL — HIGH
WBC UR QL: SIGNIFICANT CHANGE UP (ref 0–?)

## 2018-02-19 PROCEDURE — 99223 1ST HOSP IP/OBS HIGH 75: CPT

## 2018-02-19 RX ORDER — TAMOXIFEN CITRATE 20 MG/1
20 TABLET, FILM COATED ORAL ONCE
Qty: 0 | Refills: 0 | Status: DISCONTINUED | OUTPATIENT
Start: 2018-02-19 | End: 2018-02-19

## 2018-02-19 RX ORDER — ACETAMINOPHEN 500 MG
650 TABLET ORAL EVERY 6 HOURS
Qty: 0 | Refills: 0 | Status: DISCONTINUED | OUTPATIENT
Start: 2018-02-19 | End: 2018-03-14

## 2018-02-19 RX ADMIN — Medication 650 MILLIGRAM(S): at 15:29

## 2018-02-19 RX ADMIN — RISPERIDONE 2 MILLIGRAM(S): 4 TABLET ORAL at 20:15

## 2018-02-19 RX ADMIN — Medication 0.5 MILLIGRAM(S): at 20:15

## 2018-02-19 RX ADMIN — Medication 75 MICROGRAM(S): at 09:08

## 2018-02-19 RX ADMIN — Medication 81 MILLIGRAM(S): at 09:08

## 2018-02-20 LAB
ALBUMIN SERPL ELPH-MCNC: 4 G/DL — SIGNIFICANT CHANGE UP (ref 3.3–5)
ALP SERPL-CCNC: 63 U/L — SIGNIFICANT CHANGE UP (ref 40–120)
ALT FLD-CCNC: 44 U/L — HIGH (ref 4–33)
AST SERPL-CCNC: 50 U/L — HIGH (ref 4–32)
BILIRUB DIRECT SERPL-MCNC: 0.1 MG/DL — SIGNIFICANT CHANGE UP (ref 0.1–0.2)
BILIRUB SERPL-MCNC: 0.3 MG/DL — SIGNIFICANT CHANGE UP (ref 0.2–1.2)
PROT SERPL-MCNC: 7 G/DL — SIGNIFICANT CHANGE UP (ref 6–8.3)

## 2018-02-20 RX ADMIN — Medication 2 MILLIGRAM(S): at 04:01

## 2018-02-20 RX ADMIN — RISPERIDONE 2 MILLIGRAM(S): 4 TABLET ORAL at 20:32

## 2018-02-20 RX ADMIN — Medication 0.5 MILLIGRAM(S): at 20:32

## 2018-02-20 RX ADMIN — Medication 650 MILLIGRAM(S): at 17:26

## 2018-02-20 RX ADMIN — Medication 75 MICROGRAM(S): at 08:10

## 2018-02-20 RX ADMIN — Medication 81 MILLIGRAM(S): at 08:10

## 2018-02-20 RX ADMIN — Medication 2 MILLIGRAM(S): at 20:32

## 2018-02-20 NOTE — ED BEHAVIORAL HEALTH NOTE - BEHAVIORAL HEALTH NOTE
Worker obtained precertification for patient and spoke to [employer] ([phone]) and received authorized days of 2/18/18-2/21/18 with date of review on 2/21/18 with [employer] ([phone]), authorization # [id number].

## 2018-02-21 PROCEDURE — 99232 SBSQ HOSP IP/OBS MODERATE 35: CPT

## 2018-02-21 RX ADMIN — Medication 75 MICROGRAM(S): at 09:15

## 2018-02-21 RX ADMIN — Medication 2 MILLIGRAM(S): at 09:15

## 2018-02-21 RX ADMIN — Medication 81 MILLIGRAM(S): at 09:15

## 2018-02-21 RX ADMIN — Medication 2 MILLIGRAM(S): at 21:41

## 2018-02-21 RX ADMIN — Medication 0.5 MILLIGRAM(S): at 21:41

## 2018-02-21 RX ADMIN — RISPERIDONE 2 MILLIGRAM(S): 4 TABLET ORAL at 21:41

## 2018-02-22 RX ORDER — TAMOXIFEN CITRATE 20 MG/1
20 TABLET, FILM COATED ORAL DAILY
Qty: 0 | Refills: 0 | Status: DISCONTINUED | OUTPATIENT
Start: 2018-02-22 | End: 2018-03-14

## 2018-02-22 RX ORDER — TAMOXIFEN CITRATE 20 MG/1
20 TABLET, FILM COATED ORAL ONCE
Qty: 0 | Refills: 0 | Status: COMPLETED | OUTPATIENT
Start: 2018-02-22 | End: 2018-02-22

## 2018-02-22 RX ORDER — OLANZAPINE 15 MG/1
5 TABLET, FILM COATED ORAL
Qty: 0 | Refills: 0 | Status: DISCONTINUED | OUTPATIENT
Start: 2018-02-22 | End: 2018-02-26

## 2018-02-22 RX ADMIN — Medication 650 MILLIGRAM(S): at 21:44

## 2018-02-22 RX ADMIN — Medication 2 MILLIGRAM(S): at 17:32

## 2018-02-22 RX ADMIN — Medication 0.5 MILLIGRAM(S): at 20:43

## 2018-02-22 RX ADMIN — Medication 75 MICROGRAM(S): at 08:04

## 2018-02-22 RX ADMIN — Medication 650 MILLIGRAM(S): at 20:44

## 2018-02-22 RX ADMIN — TAMOXIFEN CITRATE 20 MILLIGRAM(S): 20 TABLET, FILM COATED ORAL at 14:00

## 2018-02-22 RX ADMIN — OLANZAPINE 5 MILLIGRAM(S): 15 TABLET, FILM COATED ORAL at 20:43

## 2018-02-22 RX ADMIN — Medication 81 MILLIGRAM(S): at 08:04

## 2018-02-23 RX ADMIN — TAMOXIFEN CITRATE 20 MILLIGRAM(S): 20 TABLET, FILM COATED ORAL at 08:45

## 2018-02-23 RX ADMIN — OLANZAPINE 5 MILLIGRAM(S): 15 TABLET, FILM COATED ORAL at 08:05

## 2018-02-23 RX ADMIN — Medication 650 MILLIGRAM(S): at 16:57

## 2018-02-23 RX ADMIN — Medication 81 MILLIGRAM(S): at 08:05

## 2018-02-23 RX ADMIN — Medication 75 MICROGRAM(S): at 08:05

## 2018-02-23 RX ADMIN — Medication 2 MILLIGRAM(S): at 21:12

## 2018-02-23 RX ADMIN — Medication 2 MILLIGRAM(S): at 13:00

## 2018-02-23 RX ADMIN — OLANZAPINE 5 MILLIGRAM(S): 15 TABLET, FILM COATED ORAL at 21:12

## 2018-02-24 RX ADMIN — Medication 650 MILLIGRAM(S): at 06:29

## 2018-02-24 RX ADMIN — TAMOXIFEN CITRATE 20 MILLIGRAM(S): 20 TABLET, FILM COATED ORAL at 08:07

## 2018-02-24 RX ADMIN — Medication 75 MICROGRAM(S): at 08:07

## 2018-02-24 RX ADMIN — OLANZAPINE 5 MILLIGRAM(S): 15 TABLET, FILM COATED ORAL at 20:22

## 2018-02-24 RX ADMIN — Medication 2 MILLIGRAM(S): at 08:18

## 2018-02-24 RX ADMIN — Medication 2 MILLIGRAM(S): at 20:22

## 2018-02-24 RX ADMIN — OLANZAPINE 5 MILLIGRAM(S): 15 TABLET, FILM COATED ORAL at 08:07

## 2018-02-24 RX ADMIN — Medication 650 MILLIGRAM(S): at 07:29

## 2018-02-24 RX ADMIN — Medication 81 MILLIGRAM(S): at 08:07

## 2018-02-25 RX ADMIN — OLANZAPINE 5 MILLIGRAM(S): 15 TABLET, FILM COATED ORAL at 20:47

## 2018-02-25 RX ADMIN — Medication 75 MICROGRAM(S): at 08:18

## 2018-02-25 RX ADMIN — Medication 650 MILLIGRAM(S): at 23:02

## 2018-02-25 RX ADMIN — Medication 650 MILLIGRAM(S): at 22:01

## 2018-02-25 RX ADMIN — OLANZAPINE 5 MILLIGRAM(S): 15 TABLET, FILM COATED ORAL at 08:18

## 2018-02-25 RX ADMIN — TAMOXIFEN CITRATE 20 MILLIGRAM(S): 20 TABLET, FILM COATED ORAL at 08:18

## 2018-02-25 RX ADMIN — Medication 2 MILLIGRAM(S): at 20:48

## 2018-02-25 RX ADMIN — Medication 81 MILLIGRAM(S): at 08:18

## 2018-02-26 PROCEDURE — 99232 SBSQ HOSP IP/OBS MODERATE 35: CPT

## 2018-02-26 RX ORDER — OLANZAPINE 15 MG/1
5 TABLET, FILM COATED ORAL DAILY
Qty: 0 | Refills: 0 | Status: DISCONTINUED | OUTPATIENT
Start: 2018-02-26 | End: 2018-03-08

## 2018-02-26 RX ORDER — OLANZAPINE 15 MG/1
10 TABLET, FILM COATED ORAL AT BEDTIME
Qty: 0 | Refills: 0 | Status: DISCONTINUED | OUTPATIENT
Start: 2018-02-26 | End: 2018-03-14

## 2018-02-26 RX ADMIN — OLANZAPINE 5 MILLIGRAM(S): 15 TABLET, FILM COATED ORAL at 08:36

## 2018-02-26 RX ADMIN — Medication 75 MICROGRAM(S): at 08:36

## 2018-02-26 RX ADMIN — Medication 81 MILLIGRAM(S): at 08:36

## 2018-02-26 RX ADMIN — Medication 2 MILLIGRAM(S): at 22:23

## 2018-02-26 RX ADMIN — OLANZAPINE 10 MILLIGRAM(S): 15 TABLET, FILM COATED ORAL at 20:51

## 2018-02-26 RX ADMIN — TAMOXIFEN CITRATE 20 MILLIGRAM(S): 20 TABLET, FILM COATED ORAL at 08:36

## 2018-02-27 PROCEDURE — 99232 SBSQ HOSP IP/OBS MODERATE 35: CPT

## 2018-02-27 RX ADMIN — Medication 75 MICROGRAM(S): at 08:02

## 2018-02-27 RX ADMIN — OLANZAPINE 5 MILLIGRAM(S): 15 TABLET, FILM COATED ORAL at 08:02

## 2018-02-27 RX ADMIN — OLANZAPINE 10 MILLIGRAM(S): 15 TABLET, FILM COATED ORAL at 22:12

## 2018-02-27 RX ADMIN — Medication 81 MILLIGRAM(S): at 08:02

## 2018-02-27 RX ADMIN — TAMOXIFEN CITRATE 20 MILLIGRAM(S): 20 TABLET, FILM COATED ORAL at 08:02

## 2018-02-27 RX ADMIN — Medication 2 MILLIGRAM(S): at 22:12

## 2018-02-28 PROCEDURE — 99232 SBSQ HOSP IP/OBS MODERATE 35: CPT

## 2018-02-28 RX ORDER — DIVALPROEX SODIUM 500 MG/1
500 TABLET, DELAYED RELEASE ORAL AT BEDTIME
Qty: 0 | Refills: 0 | Status: DISCONTINUED | OUTPATIENT
Start: 2018-02-28 | End: 2018-03-01

## 2018-02-28 RX ADMIN — DIVALPROEX SODIUM 500 MILLIGRAM(S): 500 TABLET, DELAYED RELEASE ORAL at 20:43

## 2018-02-28 RX ADMIN — Medication 650 MILLIGRAM(S): at 19:21

## 2018-02-28 RX ADMIN — Medication 2 MILLIGRAM(S): at 20:43

## 2018-02-28 RX ADMIN — OLANZAPINE 10 MILLIGRAM(S): 15 TABLET, FILM COATED ORAL at 20:43

## 2018-02-28 RX ADMIN — TAMOXIFEN CITRATE 20 MILLIGRAM(S): 20 TABLET, FILM COATED ORAL at 08:07

## 2018-02-28 RX ADMIN — Medication 81 MILLIGRAM(S): at 08:07

## 2018-02-28 RX ADMIN — Medication 650 MILLIGRAM(S): at 18:21

## 2018-02-28 RX ADMIN — OLANZAPINE 5 MILLIGRAM(S): 15 TABLET, FILM COATED ORAL at 08:07

## 2018-02-28 RX ADMIN — Medication 75 MICROGRAM(S): at 08:07

## 2018-03-01 RX ORDER — DIVALPROEX SODIUM 500 MG/1
1000 TABLET, DELAYED RELEASE ORAL AT BEDTIME
Qty: 0 | Refills: 0 | Status: DISCONTINUED | OUTPATIENT
Start: 2018-03-01 | End: 2018-03-08

## 2018-03-01 RX ADMIN — OLANZAPINE 10 MILLIGRAM(S): 15 TABLET, FILM COATED ORAL at 21:28

## 2018-03-01 RX ADMIN — Medication 81 MILLIGRAM(S): at 08:08

## 2018-03-01 RX ADMIN — OLANZAPINE 5 MILLIGRAM(S): 15 TABLET, FILM COATED ORAL at 08:08

## 2018-03-01 RX ADMIN — Medication 75 MICROGRAM(S): at 08:08

## 2018-03-01 RX ADMIN — Medication 2 MILLIGRAM(S): at 22:40

## 2018-03-01 RX ADMIN — TAMOXIFEN CITRATE 20 MILLIGRAM(S): 20 TABLET, FILM COATED ORAL at 08:08

## 2018-03-01 RX ADMIN — Medication 2 MILLIGRAM(S): at 16:37

## 2018-03-01 RX ADMIN — DIVALPROEX SODIUM 1000 MILLIGRAM(S): 500 TABLET, DELAYED RELEASE ORAL at 21:28

## 2018-03-02 PROCEDURE — 99232 SBSQ HOSP IP/OBS MODERATE 35: CPT

## 2018-03-02 RX ADMIN — Medication 650 MILLIGRAM(S): at 07:55

## 2018-03-02 RX ADMIN — Medication 81 MILLIGRAM(S): at 08:00

## 2018-03-02 RX ADMIN — OLANZAPINE 5 MILLIGRAM(S): 15 TABLET, FILM COATED ORAL at 08:01

## 2018-03-02 RX ADMIN — Medication 2 MILLIGRAM(S): at 20:17

## 2018-03-02 RX ADMIN — OLANZAPINE 10 MILLIGRAM(S): 15 TABLET, FILM COATED ORAL at 20:17

## 2018-03-02 RX ADMIN — Medication 75 MICROGRAM(S): at 08:00

## 2018-03-02 RX ADMIN — TAMOXIFEN CITRATE 20 MILLIGRAM(S): 20 TABLET, FILM COATED ORAL at 08:01

## 2018-03-02 RX ADMIN — Medication 650 MILLIGRAM(S): at 08:55

## 2018-03-02 RX ADMIN — DIVALPROEX SODIUM 1000 MILLIGRAM(S): 500 TABLET, DELAYED RELEASE ORAL at 20:17

## 2018-03-03 RX ADMIN — Medication 2 MILLIGRAM(S): at 20:49

## 2018-03-03 RX ADMIN — DIVALPROEX SODIUM 1000 MILLIGRAM(S): 500 TABLET, DELAYED RELEASE ORAL at 20:49

## 2018-03-03 RX ADMIN — OLANZAPINE 5 MILLIGRAM(S): 15 TABLET, FILM COATED ORAL at 08:18

## 2018-03-03 RX ADMIN — Medication 75 MICROGRAM(S): at 08:18

## 2018-03-03 RX ADMIN — Medication 81 MILLIGRAM(S): at 08:18

## 2018-03-03 RX ADMIN — TAMOXIFEN CITRATE 20 MILLIGRAM(S): 20 TABLET, FILM COATED ORAL at 08:18

## 2018-03-03 RX ADMIN — Medication 650 MILLIGRAM(S): at 08:18

## 2018-03-03 RX ADMIN — OLANZAPINE 10 MILLIGRAM(S): 15 TABLET, FILM COATED ORAL at 20:49

## 2018-03-04 RX ADMIN — Medication 75 MICROGRAM(S): at 08:22

## 2018-03-04 RX ADMIN — Medication 2 MILLIGRAM(S): at 20:44

## 2018-03-04 RX ADMIN — OLANZAPINE 10 MILLIGRAM(S): 15 TABLET, FILM COATED ORAL at 20:44

## 2018-03-04 RX ADMIN — DIVALPROEX SODIUM 1000 MILLIGRAM(S): 500 TABLET, DELAYED RELEASE ORAL at 20:44

## 2018-03-04 RX ADMIN — BENZOCAINE AND MENTHOL 1 LOZENGE: 5; 1 LIQUID ORAL at 14:29

## 2018-03-04 RX ADMIN — Medication 81 MILLIGRAM(S): at 08:22

## 2018-03-04 RX ADMIN — Medication 650 MILLIGRAM(S): at 08:22

## 2018-03-04 RX ADMIN — TAMOXIFEN CITRATE 20 MILLIGRAM(S): 20 TABLET, FILM COATED ORAL at 08:22

## 2018-03-04 RX ADMIN — OLANZAPINE 5 MILLIGRAM(S): 15 TABLET, FILM COATED ORAL at 08:22

## 2018-03-05 PROCEDURE — 99232 SBSQ HOSP IP/OBS MODERATE 35: CPT

## 2018-03-05 RX ORDER — CLONAZEPAM 1 MG
1 TABLET ORAL AT BEDTIME
Qty: 0 | Refills: 0 | Status: DISCONTINUED | OUTPATIENT
Start: 2018-03-05 | End: 2018-03-05

## 2018-03-05 RX ORDER — CLONAZEPAM 1 MG
1 TABLET ORAL AT BEDTIME
Qty: 0 | Refills: 0 | Status: DISCONTINUED | OUTPATIENT
Start: 2018-03-05 | End: 2018-03-12

## 2018-03-05 RX ADMIN — Medication 2 MILLIGRAM(S): at 22:13

## 2018-03-05 RX ADMIN — OLANZAPINE 5 MILLIGRAM(S): 15 TABLET, FILM COATED ORAL at 08:15

## 2018-03-05 RX ADMIN — TAMOXIFEN CITRATE 20 MILLIGRAM(S): 20 TABLET, FILM COATED ORAL at 08:15

## 2018-03-05 RX ADMIN — Medication 75 MICROGRAM(S): at 08:15

## 2018-03-05 RX ADMIN — BENZOCAINE AND MENTHOL 1 LOZENGE: 5; 1 LIQUID ORAL at 03:56

## 2018-03-05 RX ADMIN — Medication 1 MILLIGRAM(S): at 22:12

## 2018-03-05 RX ADMIN — OLANZAPINE 10 MILLIGRAM(S): 15 TABLET, FILM COATED ORAL at 22:12

## 2018-03-05 RX ADMIN — DIVALPROEX SODIUM 1000 MILLIGRAM(S): 500 TABLET, DELAYED RELEASE ORAL at 22:12

## 2018-03-05 RX ADMIN — Medication 81 MILLIGRAM(S): at 08:15

## 2018-03-06 RX ADMIN — OLANZAPINE 10 MILLIGRAM(S): 15 TABLET, FILM COATED ORAL at 20:17

## 2018-03-06 RX ADMIN — OLANZAPINE 5 MILLIGRAM(S): 15 TABLET, FILM COATED ORAL at 08:16

## 2018-03-06 RX ADMIN — Medication 650 MILLIGRAM(S): at 20:17

## 2018-03-06 RX ADMIN — TAMOXIFEN CITRATE 20 MILLIGRAM(S): 20 TABLET, FILM COATED ORAL at 08:16

## 2018-03-06 RX ADMIN — Medication 650 MILLIGRAM(S): at 21:17

## 2018-03-06 RX ADMIN — DIVALPROEX SODIUM 1000 MILLIGRAM(S): 500 TABLET, DELAYED RELEASE ORAL at 20:17

## 2018-03-06 RX ADMIN — Medication 81 MILLIGRAM(S): at 08:16

## 2018-03-06 RX ADMIN — Medication 75 MICROGRAM(S): at 08:16

## 2018-03-06 RX ADMIN — Medication 1 MILLIGRAM(S): at 20:17

## 2018-03-07 RX ADMIN — OLANZAPINE 5 MILLIGRAM(S): 15 TABLET, FILM COATED ORAL at 08:53

## 2018-03-07 RX ADMIN — OLANZAPINE 10 MILLIGRAM(S): 15 TABLET, FILM COATED ORAL at 20:55

## 2018-03-07 RX ADMIN — Medication 75 MICROGRAM(S): at 08:53

## 2018-03-07 RX ADMIN — DIVALPROEX SODIUM 1000 MILLIGRAM(S): 500 TABLET, DELAYED RELEASE ORAL at 20:54

## 2018-03-07 RX ADMIN — Medication 1 MILLIGRAM(S): at 20:54

## 2018-03-07 RX ADMIN — TAMOXIFEN CITRATE 20 MILLIGRAM(S): 20 TABLET, FILM COATED ORAL at 08:53

## 2018-03-07 RX ADMIN — Medication 81 MILLIGRAM(S): at 08:53

## 2018-03-08 LAB
ALBUMIN SERPL ELPH-MCNC: 3.8 G/DL — SIGNIFICANT CHANGE UP (ref 3.3–5)
ALP SERPL-CCNC: 74 U/L — SIGNIFICANT CHANGE UP (ref 40–120)
ALT FLD-CCNC: 21 U/L — SIGNIFICANT CHANGE UP (ref 4–33)
AST SERPL-CCNC: 20 U/L — SIGNIFICANT CHANGE UP (ref 4–32)
BASOPHILS # BLD AUTO: 0.05 K/UL — SIGNIFICANT CHANGE UP (ref 0–0.2)
BASOPHILS NFR BLD AUTO: 0.5 % — SIGNIFICANT CHANGE UP (ref 0–2)
BILIRUB SERPL-MCNC: 0.3 MG/DL — SIGNIFICANT CHANGE UP (ref 0.2–1.2)
BUN SERPL-MCNC: 16 MG/DL — SIGNIFICANT CHANGE UP (ref 7–23)
CALCIUM SERPL-MCNC: 9.2 MG/DL — SIGNIFICANT CHANGE UP (ref 8.4–10.5)
CHLORIDE SERPL-SCNC: 101 MMOL/L — SIGNIFICANT CHANGE UP (ref 98–107)
CO2 SERPL-SCNC: 28 MMOL/L — SIGNIFICANT CHANGE UP (ref 22–31)
CREAT SERPL-MCNC: 0.62 MG/DL — SIGNIFICANT CHANGE UP (ref 0.5–1.3)
EOSINOPHIL # BLD AUTO: 0.34 K/UL — SIGNIFICANT CHANGE UP (ref 0–0.5)
EOSINOPHIL NFR BLD AUTO: 3.5 % — SIGNIFICANT CHANGE UP (ref 0–6)
FOLATE SERPL-MCNC: 16.8 NG/ML — SIGNIFICANT CHANGE UP (ref 4.7–20)
GLUCOSE SERPL-MCNC: 123 MG/DL — HIGH (ref 70–99)
HCT VFR BLD CALC: 36.3 % — SIGNIFICANT CHANGE UP (ref 34.5–45)
HGB BLD-MCNC: 12.1 G/DL — SIGNIFICANT CHANGE UP (ref 11.5–15.5)
IMM GRANULOCYTES # BLD AUTO: 0.02 # — SIGNIFICANT CHANGE UP
IMM GRANULOCYTES NFR BLD AUTO: 0.2 % — SIGNIFICANT CHANGE UP (ref 0–1.5)
LYMPHOCYTES # BLD AUTO: 3.29 K/UL — SIGNIFICANT CHANGE UP (ref 1–3.3)
LYMPHOCYTES # BLD AUTO: 34.3 % — SIGNIFICANT CHANGE UP (ref 13–44)
MCHC RBC-ENTMCNC: 30.9 PG — SIGNIFICANT CHANGE UP (ref 27–34)
MCHC RBC-ENTMCNC: 33.3 % — SIGNIFICANT CHANGE UP (ref 32–36)
MCV RBC AUTO: 92.8 FL — SIGNIFICANT CHANGE UP (ref 80–100)
MONOCYTES # BLD AUTO: 0.73 K/UL — SIGNIFICANT CHANGE UP (ref 0–0.9)
MONOCYTES NFR BLD AUTO: 7.6 % — SIGNIFICANT CHANGE UP (ref 2–14)
NEUTROPHILS # BLD AUTO: 5.17 K/UL — SIGNIFICANT CHANGE UP (ref 1.8–7.4)
NEUTROPHILS NFR BLD AUTO: 53.9 % — SIGNIFICANT CHANGE UP (ref 43–77)
NRBC # FLD: 0 — SIGNIFICANT CHANGE UP
PLATELET # BLD AUTO: 313 K/UL — SIGNIFICANT CHANGE UP (ref 150–400)
PMV BLD: 9.9 FL — SIGNIFICANT CHANGE UP (ref 7–13)
POTASSIUM SERPL-MCNC: 4.1 MMOL/L — SIGNIFICANT CHANGE UP (ref 3.5–5.3)
POTASSIUM SERPL-SCNC: 4.1 MMOL/L — SIGNIFICANT CHANGE UP (ref 3.5–5.3)
PROT SERPL-MCNC: 7.3 G/DL — SIGNIFICANT CHANGE UP (ref 6–8.3)
RBC # BLD: 3.91 M/UL — SIGNIFICANT CHANGE UP (ref 3.8–5.2)
RBC # FLD: 12.5 % — SIGNIFICANT CHANGE UP (ref 10.3–14.5)
SODIUM SERPL-SCNC: 141 MMOL/L — SIGNIFICANT CHANGE UP (ref 135–145)
VALPROATE SERPL-MCNC: 51 UG/ML — SIGNIFICANT CHANGE UP (ref 50–100)
VIT B12 SERPL-MCNC: 381 PG/ML — SIGNIFICANT CHANGE UP (ref 200–900)
WBC # BLD: 9.6 K/UL — SIGNIFICANT CHANGE UP (ref 3.8–10.5)
WBC # FLD AUTO: 9.6 K/UL — SIGNIFICANT CHANGE UP (ref 3.8–10.5)

## 2018-03-08 RX ORDER — DIVALPROEX SODIUM 500 MG/1
1500 TABLET, DELAYED RELEASE ORAL AT BEDTIME
Qty: 0 | Refills: 0 | Status: DISCONTINUED | OUTPATIENT
Start: 2018-03-08 | End: 2018-03-13

## 2018-03-08 RX ORDER — DIVALPROEX SODIUM 500 MG/1
500 TABLET, DELAYED RELEASE ORAL AT BEDTIME
Qty: 0 | Refills: 0 | Status: DISCONTINUED | OUTPATIENT
Start: 2018-03-08 | End: 2018-03-08

## 2018-03-08 RX ADMIN — Medication 650 MILLIGRAM(S): at 06:40

## 2018-03-08 RX ADMIN — TAMOXIFEN CITRATE 20 MILLIGRAM(S): 20 TABLET, FILM COATED ORAL at 08:54

## 2018-03-08 RX ADMIN — DIVALPROEX SODIUM 1500 MILLIGRAM(S): 500 TABLET, DELAYED RELEASE ORAL at 20:50

## 2018-03-08 RX ADMIN — OLANZAPINE 5 MILLIGRAM(S): 15 TABLET, FILM COATED ORAL at 08:54

## 2018-03-08 RX ADMIN — Medication 650 MILLIGRAM(S): at 16:14

## 2018-03-08 RX ADMIN — Medication 30 MILLILITER(S): at 16:14

## 2018-03-08 RX ADMIN — BENZOCAINE AND MENTHOL 1 LOZENGE: 5; 1 LIQUID ORAL at 06:41

## 2018-03-08 RX ADMIN — OLANZAPINE 10 MILLIGRAM(S): 15 TABLET, FILM COATED ORAL at 20:50

## 2018-03-08 RX ADMIN — Medication 75 MICROGRAM(S): at 08:54

## 2018-03-08 RX ADMIN — Medication 650 MILLIGRAM(S): at 07:30

## 2018-03-08 RX ADMIN — Medication 81 MILLIGRAM(S): at 08:54

## 2018-03-09 PROCEDURE — 99232 SBSQ HOSP IP/OBS MODERATE 35: CPT | Mod: 25

## 2018-03-09 PROCEDURE — 90853 GROUP PSYCHOTHERAPY: CPT

## 2018-03-09 RX ADMIN — TAMOXIFEN CITRATE 20 MILLIGRAM(S): 20 TABLET, FILM COATED ORAL at 08:24

## 2018-03-09 RX ADMIN — OLANZAPINE 10 MILLIGRAM(S): 15 TABLET, FILM COATED ORAL at 21:03

## 2018-03-09 RX ADMIN — Medication 1 MILLIGRAM(S): at 21:03

## 2018-03-09 RX ADMIN — Medication 75 MICROGRAM(S): at 08:24

## 2018-03-09 RX ADMIN — Medication 30 MILLILITER(S): at 09:33

## 2018-03-09 RX ADMIN — Medication 81 MILLIGRAM(S): at 08:24

## 2018-03-10 RX ADMIN — Medication 650 MILLIGRAM(S): at 23:20

## 2018-03-10 RX ADMIN — Medication 75 MICROGRAM(S): at 08:44

## 2018-03-10 RX ADMIN — DIVALPROEX SODIUM 1500 MILLIGRAM(S): 500 TABLET, DELAYED RELEASE ORAL at 20:59

## 2018-03-10 RX ADMIN — Medication 1 MILLIGRAM(S): at 20:59

## 2018-03-10 RX ADMIN — TAMOXIFEN CITRATE 20 MILLIGRAM(S): 20 TABLET, FILM COATED ORAL at 08:44

## 2018-03-10 RX ADMIN — OLANZAPINE 10 MILLIGRAM(S): 15 TABLET, FILM COATED ORAL at 20:59

## 2018-03-10 RX ADMIN — Medication 81 MILLIGRAM(S): at 08:44

## 2018-03-11 RX ADMIN — Medication 75 MICROGRAM(S): at 08:30

## 2018-03-11 RX ADMIN — Medication 1 MILLIGRAM(S): at 21:26

## 2018-03-11 RX ADMIN — Medication 81 MILLIGRAM(S): at 08:30

## 2018-03-11 RX ADMIN — Medication 650 MILLIGRAM(S): at 00:37

## 2018-03-11 RX ADMIN — TAMOXIFEN CITRATE 20 MILLIGRAM(S): 20 TABLET, FILM COATED ORAL at 08:30

## 2018-03-11 RX ADMIN — OLANZAPINE 10 MILLIGRAM(S): 15 TABLET, FILM COATED ORAL at 21:26

## 2018-03-12 PROCEDURE — 99232 SBSQ HOSP IP/OBS MODERATE 35: CPT

## 2018-03-12 RX ADMIN — TAMOXIFEN CITRATE 20 MILLIGRAM(S): 20 TABLET, FILM COATED ORAL at 08:08

## 2018-03-12 RX ADMIN — OLANZAPINE 10 MILLIGRAM(S): 15 TABLET, FILM COATED ORAL at 20:23

## 2018-03-12 RX ADMIN — Medication 650 MILLIGRAM(S): at 17:29

## 2018-03-12 RX ADMIN — Medication 1 MILLIGRAM(S): at 20:23

## 2018-03-12 RX ADMIN — Medication 81 MILLIGRAM(S): at 08:08

## 2018-03-12 RX ADMIN — DIVALPROEX SODIUM 1500 MILLIGRAM(S): 500 TABLET, DELAYED RELEASE ORAL at 20:23

## 2018-03-12 RX ADMIN — Medication 75 MICROGRAM(S): at 08:08

## 2018-03-13 ENCOUNTER — TRANSCRIPTION ENCOUNTER (OUTPATIENT)
Age: 58
End: 2018-03-13

## 2018-03-13 RX ORDER — DIVALPROEX SODIUM 500 MG/1
1250 TABLET, DELAYED RELEASE ORAL AT BEDTIME
Qty: 0 | Refills: 0 | Status: DISCONTINUED | OUTPATIENT
Start: 2018-03-13 | End: 2018-03-14

## 2018-03-13 RX ADMIN — Medication 75 MICROGRAM(S): at 08:18

## 2018-03-13 RX ADMIN — TAMOXIFEN CITRATE 20 MILLIGRAM(S): 20 TABLET, FILM COATED ORAL at 08:18

## 2018-03-13 RX ADMIN — Medication 81 MILLIGRAM(S): at 08:18

## 2018-03-13 RX ADMIN — OLANZAPINE 10 MILLIGRAM(S): 15 TABLET, FILM COATED ORAL at 20:33

## 2018-03-13 RX ADMIN — DIVALPROEX SODIUM 1250 MILLIGRAM(S): 500 TABLET, DELAYED RELEASE ORAL at 20:33

## 2018-03-14 VITALS — TEMPERATURE: 98 F

## 2018-03-14 PROBLEM — Z00.00 ENCOUNTER FOR PREVENTIVE HEALTH EXAMINATION: Status: ACTIVE | Noted: 2018-03-14

## 2018-03-14 LAB
ALBUMIN SERPL ELPH-MCNC: 3.9 G/DL — SIGNIFICANT CHANGE UP (ref 3.3–5)
ALP SERPL-CCNC: 75 U/L — SIGNIFICANT CHANGE UP (ref 40–120)
ALT FLD-CCNC: 24 U/L — SIGNIFICANT CHANGE UP (ref 4–33)
AST SERPL-CCNC: 28 U/L — SIGNIFICANT CHANGE UP (ref 4–32)
BILIRUB SERPL-MCNC: 0.2 MG/DL — SIGNIFICANT CHANGE UP (ref 0.2–1.2)
BUN SERPL-MCNC: 23 MG/DL — SIGNIFICANT CHANGE UP (ref 7–23)
CALCIUM SERPL-MCNC: 8.6 MG/DL — SIGNIFICANT CHANGE UP (ref 8.4–10.5)
CHLORIDE SERPL-SCNC: 104 MMOL/L — SIGNIFICANT CHANGE UP (ref 98–107)
CO2 SERPL-SCNC: 28 MMOL/L — SIGNIFICANT CHANGE UP (ref 22–31)
CREAT SERPL-MCNC: 0.62 MG/DL — SIGNIFICANT CHANGE UP (ref 0.5–1.3)
GLUCOSE SERPL-MCNC: 129 MG/DL — HIGH (ref 70–99)
POTASSIUM SERPL-MCNC: 4.4 MMOL/L — SIGNIFICANT CHANGE UP (ref 3.5–5.3)
POTASSIUM SERPL-SCNC: 4.4 MMOL/L — SIGNIFICANT CHANGE UP (ref 3.5–5.3)
PROT SERPL-MCNC: 7.5 G/DL — SIGNIFICANT CHANGE UP (ref 6–8.3)
SODIUM SERPL-SCNC: 143 MMOL/L — SIGNIFICANT CHANGE UP (ref 135–145)
VALPROATE SERPL-MCNC: 43.4 UG/ML — LOW (ref 50–100)

## 2018-03-14 PROCEDURE — 99232 SBSQ HOSP IP/OBS MODERATE 35: CPT

## 2018-03-14 RX ORDER — RISPERIDONE 4 MG/1
0 TABLET ORAL
Qty: 0 | Refills: 0 | COMMUNITY

## 2018-03-14 RX ORDER — VALPROIC ACID (AS SODIUM SALT) 250 MG/5ML
750 SOLUTION, ORAL ORAL
Qty: 0 | Refills: 0 | COMMUNITY

## 2018-03-14 RX ORDER — TAMOXIFEN CITRATE 20 MG/1
1 TABLET, FILM COATED ORAL
Qty: 0 | Refills: 0 | COMMUNITY

## 2018-03-14 RX ORDER — ASPIRIN/CALCIUM CARB/MAGNESIUM 324 MG
1 TABLET ORAL
Qty: 30 | Refills: 0 | OUTPATIENT
Start: 2018-03-14 | End: 2018-04-12

## 2018-03-14 RX ORDER — OLANZAPINE 15 MG/1
1 TABLET, FILM COATED ORAL
Qty: 30 | Refills: 0 | OUTPATIENT
Start: 2018-03-14 | End: 2018-04-12

## 2018-03-14 RX ORDER — BENZTROPINE MESYLATE 1 MG
1 TABLET ORAL
Qty: 0 | Refills: 0 | COMMUNITY

## 2018-03-14 RX ORDER — TAMOXIFEN CITRATE 20 MG/1
1 TABLET, FILM COATED ORAL
Qty: 30 | Refills: 0 | OUTPATIENT
Start: 2018-03-14 | End: 2018-04-12

## 2018-03-14 RX ORDER — LEVOTHYROXINE SODIUM 125 MCG
1 TABLET ORAL
Qty: 30 | Refills: 0 | OUTPATIENT
Start: 2018-03-14 | End: 2018-04-12

## 2018-03-14 RX ORDER — DIVALPROEX SODIUM 500 MG/1
5 TABLET, DELAYED RELEASE ORAL
Qty: 150 | Refills: 0 | OUTPATIENT
Start: 2018-03-14 | End: 2018-04-12

## 2018-03-14 RX ADMIN — TAMOXIFEN CITRATE 20 MILLIGRAM(S): 20 TABLET, FILM COATED ORAL at 08:57

## 2018-03-14 RX ADMIN — Medication 75 MICROGRAM(S): at 08:57

## 2018-03-14 RX ADMIN — Medication 1 DROP(S): at 11:50

## 2018-03-14 RX ADMIN — Medication 81 MILLIGRAM(S): at 08:57

## 2018-03-16 ENCOUNTER — OUTPATIENT (OUTPATIENT)
Dept: OUTPATIENT SERVICES | Facility: HOSPITAL | Age: 58
LOS: 1 days | Discharge: HOME | End: 2018-03-16

## 2018-03-16 DIAGNOSIS — Z98.890 OTHER SPECIFIED POSTPROCEDURAL STATES: Chronic | ICD-10-CM

## 2018-03-19 DIAGNOSIS — F31.70 BIPOLAR DISORDER, CURRENTLY IN REMISSION, MOST RECENT EPISODE UNSPECIFIED: ICD-10-CM

## 2018-03-21 ENCOUNTER — APPOINTMENT (OUTPATIENT)
Dept: INTERNAL MEDICINE | Facility: CLINIC | Age: 58
End: 2018-03-21

## 2018-05-19 ENCOUNTER — EMERGENCY (EMERGENCY)
Facility: HOSPITAL | Age: 58
LOS: 1 days | Discharge: HOME | End: 2018-05-19
Attending: EMERGENCY MEDICINE | Admitting: EMERGENCY MEDICINE
Payer: MEDICAID

## 2018-05-19 VITALS
RESPIRATION RATE: 16 BRPM | SYSTOLIC BLOOD PRESSURE: 154 MMHG | TEMPERATURE: 98 F | HEART RATE: 97 BPM | DIASTOLIC BLOOD PRESSURE: 104 MMHG

## 2018-05-19 VITALS
DIASTOLIC BLOOD PRESSURE: 111 MMHG | SYSTOLIC BLOOD PRESSURE: 148 MMHG | RESPIRATION RATE: 16 BRPM | HEART RATE: 75 BPM | OXYGEN SATURATION: 100 % | TEMPERATURE: 98 F

## 2018-05-19 DIAGNOSIS — Z98.890 OTHER SPECIFIED POSTPROCEDURAL STATES: Chronic | ICD-10-CM

## 2018-05-19 PROCEDURE — 99283 EMERGENCY DEPT VISIT LOW MDM: CPT | Mod: 25

## 2018-05-19 PROCEDURE — 90792 PSYCH DIAG EVAL W/MED SRVCS: CPT

## 2018-05-19 RX ORDER — DIVALPROEX SODIUM 500 MG/1
1000 TABLET, DELAYED RELEASE ORAL ONCE
Qty: 0 | Refills: 0 | Status: COMPLETED | OUTPATIENT
Start: 2018-05-19 | End: 2018-05-19

## 2018-05-19 RX ORDER — ACETAMINOPHEN 500 MG
650 TABLET ORAL ONCE
Qty: 0 | Refills: 0 | Status: COMPLETED | OUTPATIENT
Start: 2018-05-19 | End: 2018-05-19

## 2018-05-19 RX ORDER — OLANZAPINE 15 MG/1
5 TABLET, FILM COATED ORAL ONCE
Qty: 0 | Refills: 0 | Status: COMPLETED | OUTPATIENT
Start: 2018-05-19 | End: 2018-05-19

## 2018-05-19 RX ADMIN — Medication 650 MILLIGRAM(S): at 05:33

## 2018-05-19 RX ADMIN — DIVALPROEX SODIUM 1000 MILLIGRAM(S): 500 TABLET, DELAYED RELEASE ORAL at 05:33

## 2018-05-19 RX ADMIN — OLANZAPINE 5 MILLIGRAM(S): 15 TABLET, FILM COATED ORAL at 05:34
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
## 2018-05-19 NOTE — ED BEHAVIORAL HEALTH ASSESSMENT NOTE - DESCRIPTION
Has four children, unable to provide much more details.  States she is Yazidism, and Gyanness and reports being a . In ED patient’s vitals were stable, , EKG QTc 469. Hypothyroidism, Brest biopsy with concern for cancern No agitation, took her depakote and olanzapine Vital Signs Last 24 Hrs  T(C): 36.9 (19 May 2018 00:35), Max: 36.9 (19 May 2018 00:35)  T(F): 98.5 (19 May 2018 00:35), Max: 98.5 (19 May 2018 00:35)  HR: 97 (19 May 2018 00:35) (97 - 97)  BP: 154/104 (19 May 2018 00:35) (154/104 - 154/104)  BP(mean): --  RR: 16 (19 May 2018 00:35) (16 - 16)  SpO2: -- Has four children, lives with 3 of them. Lost her  to ALS Unemployed
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
## 2018-05-19 NOTE — ED BEHAVIORAL HEALTH ASSESSMENT NOTE - OTHER
daughter Rena increased "I feel good" labile, tearful -bright somewhat tangential became compliant recently
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
## 2018-05-19 NOTE — ED BEHAVIORAL HEALTH ASSESSMENT NOTE - OTHER PAST PSYCHIATRIC HISTORY (INCLUDE DETAILS REGARDING ONSET, COURSE OF ILLNESS, INPATIENT/OUTPATIENT TREATMENT)
No SA, two admission, last 2015. History of Geodon, and inactive outpatient treatment at [facility]. No SA, 3 admissions, last 2018. History of Geodon, and inactive outpatient treatment at [facility]. Currently she is referred to [facility] ; but still under care of Dr Rose

## 2018-05-19 NOTE — ED BEHAVIORAL HEALTH ASSESSMENT NOTE - ORAL MEDICATION DETAILS
lorazepam 2mg PO depakote 1000 mg and Olanzapine 5 mg (daughter and pt reported that she didn't take her night meds yet)

## 2018-05-19 NOTE — ED BEHAVIORAL HEALTH ASSESSMENT NOTE - CURRENT MEDICATION
risperidone 3mg QHS  valproic acid 750 mg daily  tamoxifen 20mg daily   synthroid 75 mcg daily   cogentin 0.5mg qhs olanzapine 5 mg QHS   valproic acid 1250 mg daily  tamoxifen 20mg daily   synthroid 75 mcg daily

## 2018-05-19 NOTE — ED BEHAVIORAL HEALTH ASSESSMENT NOTE - SUICIDE PROTECTIVE FACTORS
Responsibility to family and others Responsibility to family and others/Supportive social network or family/Future oriented/Positive therapeutic relationships

## 2018-05-19 NOTE — ED BEHAVIORAL HEALTH ASSESSMENT NOTE - SUICIDE RISK FACTORS
Mood episode/Command hallucinations to hurt self/Highly impulsive behavior Highly impulsive behavior/Mood episode
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
## 2018-05-19 NOTE — ED BEHAVIORAL HEALTH ASSESSMENT NOTE - PSYCHIATRIC ISSUES AND PLAN (INCLUDE STANDING AND PRN MEDICATION)
Involuntary Admission under 9.39 to Low 4; Admission orders; No SI/HI therefore no constant observation (1:1) for safety; PRN agitation medications; CTH w/o contrast and medical clearance prior to admission; hold valproic secondary to mild transaminitis, slow titration for risperidone secondary to elevated QTc.

## 2018-05-19 NOTE — ED PROVIDER NOTE - PROGRESS NOTE DETAILS
Pt seen and eval by kodi. Medications being restarted. As per kodi pt safe to be d/c back to home. Awaiting transport.
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
## 2018-09-16 ENCOUNTER — INPATIENT (INPATIENT)
Facility: HOSPITAL | Age: 58
LOS: 18 days | Discharge: HOME | End: 2018-10-05
Attending: PSYCHIATRY & NEUROLOGY | Admitting: PSYCHIATRY & NEUROLOGY
Payer: MEDICAID

## 2018-09-16 VITALS
RESPIRATION RATE: 16 BRPM | HEART RATE: 96 BPM | OXYGEN SATURATION: 99 % | SYSTOLIC BLOOD PRESSURE: 136 MMHG | TEMPERATURE: 98 F | DIASTOLIC BLOOD PRESSURE: 94 MMHG

## 2018-09-16 DIAGNOSIS — F31.64 BIPOLAR DISORDER, CURRENT EPISODE MIXED, SEVERE, WITH PSYCHOTIC FEATURES: ICD-10-CM

## 2018-09-16 DIAGNOSIS — R69 ILLNESS, UNSPECIFIED: ICD-10-CM

## 2018-09-16 DIAGNOSIS — Z98.890 OTHER SPECIFIED POSTPROCEDURAL STATES: Chronic | ICD-10-CM

## 2018-09-16 LAB
ALBUMIN SERPL ELPH-MCNC: 4.2 G/DL — SIGNIFICANT CHANGE UP (ref 3.3–5)
ALP SERPL-CCNC: 65 U/L — SIGNIFICANT CHANGE UP (ref 40–120)
ALT FLD-CCNC: 45 U/L — HIGH (ref 4–33)
AMPHET UR-MCNC: NEGATIVE — SIGNIFICANT CHANGE UP
APAP SERPL-MCNC: < 15 UG/ML — LOW (ref 15–25)
APPEARANCE UR: CLEAR — SIGNIFICANT CHANGE UP
AST SERPL-CCNC: 45 U/L — HIGH (ref 4–32)
BACTERIA # UR AUTO: NEGATIVE — SIGNIFICANT CHANGE UP
BARBITURATES UR SCN-MCNC: NEGATIVE — SIGNIFICANT CHANGE UP
BASOPHILS # BLD AUTO: 0.04 K/UL — SIGNIFICANT CHANGE UP (ref 0–0.2)
BASOPHILS NFR BLD AUTO: 0.4 % — SIGNIFICANT CHANGE UP (ref 0–2)
BENZODIAZ UR-MCNC: NEGATIVE — SIGNIFICANT CHANGE UP
BILIRUB SERPL-MCNC: 0.4 MG/DL — SIGNIFICANT CHANGE UP (ref 0.2–1.2)
BILIRUB UR-MCNC: NEGATIVE — SIGNIFICANT CHANGE UP
BLOOD UR QL VISUAL: NEGATIVE — SIGNIFICANT CHANGE UP
BUN SERPL-MCNC: 18 MG/DL — SIGNIFICANT CHANGE UP (ref 7–23)
CALCIUM SERPL-MCNC: 9.2 MG/DL — SIGNIFICANT CHANGE UP (ref 8.4–10.5)
CANNABINOIDS UR-MCNC: NEGATIVE — SIGNIFICANT CHANGE UP
CHLORIDE SERPL-SCNC: 106 MMOL/L — SIGNIFICANT CHANGE UP (ref 98–107)
CO2 SERPL-SCNC: 24 MMOL/L — SIGNIFICANT CHANGE UP (ref 22–31)
COCAINE METAB.OTHER UR-MCNC: NEGATIVE — SIGNIFICANT CHANGE UP
COLOR SPEC: SIGNIFICANT CHANGE UP
CREAT SERPL-MCNC: 0.66 MG/DL — SIGNIFICANT CHANGE UP (ref 0.5–1.3)
EOSINOPHIL # BLD AUTO: 0.15 K/UL — SIGNIFICANT CHANGE UP (ref 0–0.5)
EOSINOPHIL NFR BLD AUTO: 1.6 % — SIGNIFICANT CHANGE UP (ref 0–6)
ETHANOL BLD-MCNC: < 10 MG/DL — SIGNIFICANT CHANGE UP
GLUCOSE SERPL-MCNC: 97 MG/DL — SIGNIFICANT CHANGE UP (ref 70–99)
GLUCOSE UR-MCNC: NEGATIVE — SIGNIFICANT CHANGE UP
HCT VFR BLD CALC: 34.5 % — SIGNIFICANT CHANGE UP (ref 34.5–45)
HGB BLD-MCNC: 11.1 G/DL — LOW (ref 11.5–15.5)
HYALINE CASTS # UR AUTO: NEGATIVE — SIGNIFICANT CHANGE UP
IMM GRANULOCYTES # BLD AUTO: 0.03 # — SIGNIFICANT CHANGE UP
IMM GRANULOCYTES NFR BLD AUTO: 0.3 % — SIGNIFICANT CHANGE UP (ref 0–1.5)
KETONES UR-MCNC: NEGATIVE — SIGNIFICANT CHANGE UP
LEUKOCYTE ESTERASE UR-ACNC: NEGATIVE — SIGNIFICANT CHANGE UP
LYMPHOCYTES # BLD AUTO: 3.64 K/UL — HIGH (ref 1–3.3)
LYMPHOCYTES # BLD AUTO: 38.8 % — SIGNIFICANT CHANGE UP (ref 13–44)
MCHC RBC-ENTMCNC: 29.9 PG — SIGNIFICANT CHANGE UP (ref 27–34)
MCHC RBC-ENTMCNC: 32.2 % — SIGNIFICANT CHANGE UP (ref 32–36)
MCV RBC AUTO: 93 FL — SIGNIFICANT CHANGE UP (ref 80–100)
METHADONE UR-MCNC: NEGATIVE — SIGNIFICANT CHANGE UP
MONOCYTES # BLD AUTO: 0.89 K/UL — SIGNIFICANT CHANGE UP (ref 0–0.9)
MONOCYTES NFR BLD AUTO: 9.5 % — SIGNIFICANT CHANGE UP (ref 2–14)
NEUTROPHILS # BLD AUTO: 4.64 K/UL — SIGNIFICANT CHANGE UP (ref 1.8–7.4)
NEUTROPHILS NFR BLD AUTO: 49.4 % — SIGNIFICANT CHANGE UP (ref 43–77)
NITRITE UR-MCNC: NEGATIVE — SIGNIFICANT CHANGE UP
NRBC # FLD: 0 — SIGNIFICANT CHANGE UP
OPIATES UR-MCNC: NEGATIVE — SIGNIFICANT CHANGE UP
OXYCODONE UR-MCNC: NEGATIVE — SIGNIFICANT CHANGE UP
PCP UR-MCNC: NEGATIVE — SIGNIFICANT CHANGE UP
PH UR: 7 — SIGNIFICANT CHANGE UP (ref 5–8)
PLATELET # BLD AUTO: 242 K/UL — SIGNIFICANT CHANGE UP (ref 150–400)
PMV BLD: 10.2 FL — SIGNIFICANT CHANGE UP (ref 7–13)
POTASSIUM SERPL-MCNC: 3.7 MMOL/L — SIGNIFICANT CHANGE UP (ref 3.5–5.3)
POTASSIUM SERPL-SCNC: 3.7 MMOL/L — SIGNIFICANT CHANGE UP (ref 3.5–5.3)
PROT SERPL-MCNC: 6.8 G/DL — SIGNIFICANT CHANGE UP (ref 6–8.3)
PROT UR-MCNC: 20 — SIGNIFICANT CHANGE UP
RBC # BLD: 3.71 M/UL — LOW (ref 3.8–5.2)
RBC # FLD: 13.1 % — SIGNIFICANT CHANGE UP (ref 10.3–14.5)
RBC CASTS # UR COMP ASSIST: SIGNIFICANT CHANGE UP (ref 0–?)
SALICYLATES SERPL-MCNC: < 5 MG/DL — LOW (ref 15–30)
SODIUM SERPL-SCNC: 144 MMOL/L — SIGNIFICANT CHANGE UP (ref 135–145)
SP GR SPEC: 1.02 — SIGNIFICANT CHANGE UP (ref 1–1.04)
SQUAMOUS # UR AUTO: SIGNIFICANT CHANGE UP
TSH SERPL-MCNC: 3.93 UIU/ML — SIGNIFICANT CHANGE UP (ref 0.27–4.2)
UROBILINOGEN FLD QL: NORMAL — SIGNIFICANT CHANGE UP
WBC # BLD: 9.39 K/UL — SIGNIFICANT CHANGE UP (ref 3.8–10.5)
WBC # FLD AUTO: 9.39 K/UL — SIGNIFICANT CHANGE UP (ref 3.8–10.5)
WBC UR QL: SIGNIFICANT CHANGE UP (ref 0–?)

## 2018-09-16 PROCEDURE — 99285 EMERGENCY DEPT VISIT HI MDM: CPT

## 2018-09-16 RX ORDER — DIPHENHYDRAMINE HCL 50 MG
50 CAPSULE ORAL ONCE
Qty: 0 | Refills: 0 | Status: DISCONTINUED | OUTPATIENT
Start: 2018-09-17 | End: 2018-10-05

## 2018-09-16 RX ORDER — OLANZAPINE 15 MG/1
5 TABLET, FILM COATED ORAL AT BEDTIME
Qty: 0 | Refills: 0 | Status: DISCONTINUED | OUTPATIENT
Start: 2018-09-17 | End: 2018-09-19

## 2018-09-16 RX ORDER — DIVALPROEX SODIUM 500 MG/1
500 TABLET, DELAYED RELEASE ORAL AT BEDTIME
Qty: 0 | Refills: 0 | Status: DISCONTINUED | OUTPATIENT
Start: 2018-09-17 | End: 2018-09-20

## 2018-09-16 RX ORDER — DIPHENHYDRAMINE HCL 50 MG
50 CAPSULE ORAL ONCE
Qty: 0 | Refills: 0 | Status: DISCONTINUED | OUTPATIENT
Start: 2018-09-16 | End: 2018-09-16

## 2018-09-16 RX ORDER — ASPIRIN/CALCIUM CARB/MAGNESIUM 324 MG
81 TABLET ORAL DAILY
Qty: 0 | Refills: 0 | Status: DISCONTINUED | OUTPATIENT
Start: 2018-09-17 | End: 2018-10-05

## 2018-09-16 RX ORDER — TAMOXIFEN CITRATE 20 MG/1
20 TABLET, FILM COATED ORAL DAILY
Qty: 0 | Refills: 0 | Status: DISCONTINUED | OUTPATIENT
Start: 2018-09-17 | End: 2018-10-05

## 2018-09-16 RX ORDER — LEVOTHYROXINE SODIUM 125 MCG
75 TABLET ORAL DAILY
Qty: 0 | Refills: 0 | Status: DISCONTINUED | OUTPATIENT
Start: 2018-09-17 | End: 2018-10-05

## 2018-09-16 RX ORDER — HALOPERIDOL DECANOATE 100 MG/ML
5 INJECTION INTRAMUSCULAR ONCE
Qty: 0 | Refills: 0 | Status: DISCONTINUED | OUTPATIENT
Start: 2018-09-17 | End: 2018-10-05

## 2018-09-16 RX ORDER — DIPHENHYDRAMINE HCL 50 MG
50 CAPSULE ORAL EVERY 6 HOURS
Qty: 0 | Refills: 0 | Status: DISCONTINUED | OUTPATIENT
Start: 2018-09-17 | End: 2018-10-05

## 2018-09-16 RX ORDER — HALOPERIDOL DECANOATE 100 MG/ML
5 INJECTION INTRAMUSCULAR EVERY 6 HOURS
Qty: 0 | Refills: 0 | Status: DISCONTINUED | OUTPATIENT
Start: 2018-09-17 | End: 2018-10-05

## 2018-09-16 RX ADMIN — Medication 2 MILLIGRAM(S): at 22:55
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
## 2018-09-16 NOTE — ED BEHAVIORAL HEALTH ASSESSMENT NOTE - MEDICAL ISSUES AND PLAN (INCLUDE STANDING AND PRN MEDICATION)
breast cancer prophylaxis: restart tamoxifen 20mg qd, hypothyroidism-restart levothyroxine 75 mcg qd, aspirin 81mg qd

## 2018-09-16 NOTE — ED BEHAVIORAL HEALTH ASSESSMENT NOTE - HPI (INCLUDE ILLNESS QUALITY, SEVERITY, DURATION, TIMING, CONTEXT, MODIFYING FACTORS, ASSOCIATED SIGNS AND SYMPTOMS)
57 y/o F; ; domiciled with family, unemployed with PPHx of Bipolar disorder; 3 past admissions last [facility] May 2018; pt currently in [facility] but not following up with appnts;, no substance abuse; no Hx of violence, no Hx of SA,  PMHx of Hypothyroidism and breast cancer BIB EMS after patient called 911 on herself presenting with persecutory delusions and affective instability in context of medication non compliance.     On interview, the patient is hysterically crying, peaking out through the bed covers, she is labile, with disorganized thought processes. She states that she feels unsafe at home, she believes her family wants to hurt her and her life is in danger, and several times during interview she cries "I am homeless!" She states that she called 911 today as she was trying to "hide in my friend's house" and she stated her family stopped her. She states that she is not sleeping because she does not take her meds because "my daughter hides my medication from me". She states that her famiy "lied" to EMS and told them the pt was "crazy". She minimizes all psychiatric concerns, denies SI/I/P or HI/I/H, denies AVH/CAH. She states that she does not want to be admitted psychiatrically.     CVM reviewed: patient was last seen in [facility] in June 2018, and has not followed up with appnts (was transferred to Dr. Travis) and a pre-10 day letter was sent.     Collateral hx from daughter: concerning for disorganized and manic behavior, daughter concerned fro mom's safety, not taking meds.

## 2018-09-16 NOTE — ED BEHAVIORAL HEALTH ASSESSMENT NOTE - RISK ASSESSMENT
Patient is at high risk given severe affective instability, medication non compliance, not sleeping, poor judgement and high impulsivity. She poses an imminent danger to self as she is unable to care for self due to acute genoveva and requires inpatient psychiatric hospitalization for treatment and stabilization. No need for 1:1 CO as denies homicidal or suicidal intent. Transfer to [facility] via EMS, w buckle guard remove on arrival.

## 2018-09-16 NOTE — ED ADULT NURSE NOTE - NSIMPLEMENTINTERV_GEN_ALL_ED
Implemented All Universal Safety Interventions:  [place] to call system. Call bell, personal items and telephone within reach. Instruct patient to call for assistance. Room bathroom lighting operational. Non-slip footwear when patient is off stretcher. Physically safe environment: no spills, clutter or unnecessary equipment. Stretcher in lowest position, wheels locked, appropriate side rails in place.

## 2018-09-16 NOTE — ED ADULT NURSE REASSESSMENT NOTE - NS ED NURSE REASSESS COMMENT FT1
+ effect from Ativan 2mg IM received, pt sleeping comfortably, NAD, even unlabored respirations observed. Pending  for involuntary [facility] admission. Will continue to monitor for safety.

## 2018-09-16 NOTE — ED BEHAVIORAL HEALTH ASSESSMENT NOTE - DESCRIPTION
Hypothyroidism, Brest biopsy with concern for cancern Has four children, lives with 3 of them. Lost her  to ALS Unemployed crying, labile  ICU Vital Signs Last 24 Hrs  T(C): 36.7 (16 Sep 2018 20:10), Max: 36.7 (16 Sep 2018 20:10)  T(F): 98 (16 Sep 2018 20:10), Max: 98 (16 Sep 2018 20:10)  HR: 96 (16 Sep 2018 20:10) (96 - 96)  BP: 136/94 (16 Sep 2018 20:10) (136/94 - 136/94)  BP(mean): --  ABP: --  ABP(mean): --  RR: 16 (16 Sep 2018 20:10) (16 - 16)  SpO2: 99% (16 Sep 2018 20:10) (99% - 99%)
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
## 2018-09-16 NOTE — ED BEHAVIORAL HEALTH ASSESSMENT NOTE - PAST PSYCHOTROPIC MEDICATION
olanzapine 5 mg QHS   valproic acid 1250 mg daily  tamoxifen 20mg daily   synthroid 75 mcg daily risperidone + valproic acid, Geodon

## 2018-09-16 NOTE — ED BEHAVIORAL HEALTH ASSESSMENT NOTE - PSYCHIATRIC ISSUES AND PLAN (INCLUDE STANDING AND PRN MEDICATION)
bipolar genoveva: start depakote ER 500mg qhs, zyprexa 5mg hs (inpt team can titrate), Haldol 5mg PO q 6 hrs prn agitation; Ativan 2mg PO q 6hrs prn anxiety/agitation, Diphenhydramine 50mg PO q 6hr prn EPS, insomnia or agitation.      Haldol 5mg IM; Ativan 2mg IM, Diphenhydramine 50mg IM once prn severe agitation (combativeness, assaultive behavior) after notification of a prescribing clinician.

## 2018-09-16 NOTE — ED ADULT NURSE REASSESSMENT NOTE - REASSESS COMMUNICATION
MARLY Villanueva aware, ordered pt to receive STAT Ativan 2mg IM. Will continue to monitor for safety and therapeutic effect./ED physician notified
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
## 2018-09-16 NOTE — ED BEHAVIORAL HEALTH ASSESSMENT NOTE - OTHER PAST PSYCHIATRIC HISTORY (INCLUDE DETAILS REGARDING ONSET, COURSE OF ILLNESS, INPATIENT/OUTPATIENT TREATMENT)
No SA, 4 admissions, last 2018. History of Geodon, and inactive outpatient treatment at [facility].

## 2018-09-17 DIAGNOSIS — F31.9 BIPOLAR DISORDER, UNSPECIFIED: ICD-10-CM

## 2018-09-17 PROCEDURE — 99221 1ST HOSP IP/OBS SF/LOW 40: CPT

## 2018-09-17 RX ADMIN — DIVALPROEX SODIUM 500 MILLIGRAM(S): 500 TABLET, DELAYED RELEASE ORAL at 21:33

## 2018-09-17 RX ADMIN — TAMOXIFEN CITRATE 20 MILLIGRAM(S): 20 TABLET, FILM COATED ORAL at 09:01

## 2018-09-17 RX ADMIN — OLANZAPINE 5 MILLIGRAM(S): 15 TABLET, FILM COATED ORAL at 21:33

## 2018-09-17 RX ADMIN — Medication 75 MICROGRAM(S): at 09:01

## 2018-09-17 RX ADMIN — Medication 81 MILLIGRAM(S): at 09:01

## 2018-09-17 NOTE — ED PROVIDER NOTE - OBJECTIVE STATEMENT
57 y/o M hx Bipolar D/O, Hypothyroid  BIBA  w c/o genoveva and bizarre behaviour secondary to  medication non compliance. Daughter states that patient has been spending excessively and has been randomly meeting strangers on the street. Patient appears paranoid.  Denies SI/HI/AH/VH.  Denies falling, punching or kicking any objects. Denies pain, SOB, dizziness  fever, chills  chest/ abdominal discomfort. Denies recent use of alcohol or illicit drugs.

## 2018-09-17 NOTE — ED ADULT NURSE REASSESSMENT NOTE - NS ED NURSE REASSESS COMMENT FT1
Medically cleared for [facility] admission by Rich LUKE. Report given to [facility] ELSIE K[place]ney. Awaiting ems for safe transfer to [facility]. Pt remains asleep, NAD. Will continue to monitor for safety.

## 2018-09-17 NOTE — ED PROVIDER NOTE - MEDICAL DECISION MAKING DETAILS
59 y/o M hx Bipolar D/O, Hypothyroid   Labs, Urine Tox/UA, EKG   Medical evaluation performed. There is no clinical evidence of intoxication or any acute medical problem requiring immediate intervention. Psychiatric consultation called recommend inpatient psychiatric admission

## 2018-09-18 PROCEDURE — 99232 SBSQ HOSP IP/OBS MODERATE 35: CPT

## 2018-09-18 RX ORDER — ACETAMINOPHEN 500 MG
650 TABLET ORAL EVERY 6 HOURS
Qty: 0 | Refills: 0 | Status: DISCONTINUED | OUTPATIENT
Start: 2018-09-18 | End: 2018-10-05

## 2018-09-18 RX ORDER — ACETAMINOPHEN 500 MG
650 TABLET ORAL ONCE
Qty: 0 | Refills: 0 | Status: DISCONTINUED | OUTPATIENT
Start: 2018-09-18 | End: 2018-10-05

## 2018-09-18 RX ADMIN — TAMOXIFEN CITRATE 20 MILLIGRAM(S): 20 TABLET, FILM COATED ORAL at 09:36

## 2018-09-18 RX ADMIN — DIVALPROEX SODIUM 500 MILLIGRAM(S): 500 TABLET, DELAYED RELEASE ORAL at 20:30

## 2018-09-18 RX ADMIN — Medication 650 MILLIGRAM(S): at 16:28

## 2018-09-18 RX ADMIN — Medication 650 MILLIGRAM(S): at 17:37

## 2018-09-18 RX ADMIN — OLANZAPINE 5 MILLIGRAM(S): 15 TABLET, FILM COATED ORAL at 20:30

## 2018-09-18 RX ADMIN — Medication 75 MICROGRAM(S): at 09:36

## 2018-09-18 RX ADMIN — Medication 81 MILLIGRAM(S): at 09:37

## 2018-09-19 PROCEDURE — 99232 SBSQ HOSP IP/OBS MODERATE 35: CPT

## 2018-09-19 RX ORDER — OLANZAPINE 15 MG/1
10 TABLET, FILM COATED ORAL AT BEDTIME
Qty: 0 | Refills: 0 | Status: DISCONTINUED | OUTPATIENT
Start: 2018-09-19 | End: 2018-10-05

## 2018-09-19 RX ORDER — BACITRACIN ZINC 500 UNIT/G
1 OINTMENT IN PACKET (EA) TOPICAL
Qty: 0 | Refills: 0 | Status: DISCONTINUED | OUTPATIENT
Start: 2018-09-19 | End: 2018-10-05

## 2018-09-19 RX ADMIN — OLANZAPINE 10 MILLIGRAM(S): 15 TABLET, FILM COATED ORAL at 21:39

## 2018-09-19 RX ADMIN — Medication 1 APPLICATION(S): at 21:39

## 2018-09-19 RX ADMIN — TAMOXIFEN CITRATE 20 MILLIGRAM(S): 20 TABLET, FILM COATED ORAL at 08:31

## 2018-09-19 RX ADMIN — Medication 650 MILLIGRAM(S): at 22:31

## 2018-09-19 RX ADMIN — Medication 75 MICROGRAM(S): at 08:31

## 2018-09-19 RX ADMIN — Medication 81 MILLIGRAM(S): at 08:31

## 2018-09-19 RX ADMIN — Medication 650 MILLIGRAM(S): at 21:28

## 2018-09-19 RX ADMIN — DIVALPROEX SODIUM 500 MILLIGRAM(S): 500 TABLET, DELAYED RELEASE ORAL at 21:39

## 2018-09-20 PROCEDURE — 99232 SBSQ HOSP IP/OBS MODERATE 35: CPT | Mod: 25

## 2018-09-20 PROCEDURE — 90853 GROUP PSYCHOTHERAPY: CPT

## 2018-09-20 RX ORDER — DIVALPROEX SODIUM 500 MG/1
1000 TABLET, DELAYED RELEASE ORAL AT BEDTIME
Qty: 0 | Refills: 0 | Status: DISCONTINUED | OUTPATIENT
Start: 2018-09-20 | End: 2018-09-25

## 2018-09-20 RX ORDER — LANOLIN ALCOHOL/MO/W.PET/CERES
5 CREAM (GRAM) TOPICAL AT BEDTIME
Qty: 0 | Refills: 0 | Status: DISCONTINUED | OUTPATIENT
Start: 2018-09-20 | End: 2018-10-05

## 2018-09-20 RX ADMIN — Medication 81 MILLIGRAM(S): at 10:13

## 2018-09-20 RX ADMIN — Medication 5 MILLIGRAM(S): at 22:30

## 2018-09-20 RX ADMIN — OLANZAPINE 10 MILLIGRAM(S): 15 TABLET, FILM COATED ORAL at 22:31

## 2018-09-20 RX ADMIN — Medication 75 MICROGRAM(S): at 10:13

## 2018-09-20 RX ADMIN — Medication 1 APPLICATION(S): at 10:12

## 2018-09-20 RX ADMIN — HALOPERIDOL DECANOATE 5 MILLIGRAM(S): 100 INJECTION INTRAMUSCULAR at 02:30

## 2018-09-20 RX ADMIN — TAMOXIFEN CITRATE 20 MILLIGRAM(S): 20 TABLET, FILM COATED ORAL at 10:13

## 2018-09-20 RX ADMIN — Medication 50 MILLIGRAM(S): at 02:30

## 2018-09-20 RX ADMIN — DIVALPROEX SODIUM 1000 MILLIGRAM(S): 500 TABLET, DELAYED RELEASE ORAL at 22:30

## 2018-09-21 PROCEDURE — 99232 SBSQ HOSP IP/OBS MODERATE 35: CPT

## 2018-09-21 RX ADMIN — TAMOXIFEN CITRATE 20 MILLIGRAM(S): 20 TABLET, FILM COATED ORAL at 09:48

## 2018-09-21 RX ADMIN — Medication 1 APPLICATION(S): at 09:47

## 2018-09-21 RX ADMIN — Medication 75 MICROGRAM(S): at 09:47

## 2018-09-21 RX ADMIN — Medication 81 MILLIGRAM(S): at 09:47

## 2018-09-21 RX ADMIN — Medication 1 APPLICATION(S): at 22:46

## 2018-09-21 RX ADMIN — OLANZAPINE 10 MILLIGRAM(S): 15 TABLET, FILM COATED ORAL at 22:47

## 2018-09-21 RX ADMIN — DIVALPROEX SODIUM 1000 MILLIGRAM(S): 500 TABLET, DELAYED RELEASE ORAL at 22:46

## 2018-09-21 RX ADMIN — Medication 5 MILLIGRAM(S): at 22:47

## 2018-09-22 RX ADMIN — Medication 81 MILLIGRAM(S): at 09:05

## 2018-09-22 RX ADMIN — Medication 1 APPLICATION(S): at 20:57

## 2018-09-22 RX ADMIN — Medication 75 MICROGRAM(S): at 09:05

## 2018-09-22 RX ADMIN — Medication 1 APPLICATION(S): at 09:05

## 2018-09-22 RX ADMIN — Medication 5 MILLIGRAM(S): at 20:57

## 2018-09-22 RX ADMIN — Medication 650 MILLIGRAM(S): at 20:58

## 2018-09-22 RX ADMIN — DIVALPROEX SODIUM 1000 MILLIGRAM(S): 500 TABLET, DELAYED RELEASE ORAL at 20:58

## 2018-09-22 RX ADMIN — OLANZAPINE 10 MILLIGRAM(S): 15 TABLET, FILM COATED ORAL at 20:57

## 2018-09-22 RX ADMIN — TAMOXIFEN CITRATE 20 MILLIGRAM(S): 20 TABLET, FILM COATED ORAL at 09:05

## 2018-09-23 LAB
APPEARANCE UR: CLEAR — SIGNIFICANT CHANGE UP
BILIRUB UR-MCNC: NEGATIVE — SIGNIFICANT CHANGE UP
BLOOD UR QL VISUAL: NEGATIVE — SIGNIFICANT CHANGE UP
COLOR SPEC: SIGNIFICANT CHANGE UP
GLUCOSE UR-MCNC: NEGATIVE — SIGNIFICANT CHANGE UP
KETONES UR-MCNC: NEGATIVE — SIGNIFICANT CHANGE UP
LEUKOCYTE ESTERASE UR-ACNC: NEGATIVE — SIGNIFICANT CHANGE UP
NITRITE UR-MCNC: NEGATIVE — SIGNIFICANT CHANGE UP
PH UR: 7.5 — SIGNIFICANT CHANGE UP (ref 5–8)
PROT UR-MCNC: NEGATIVE — SIGNIFICANT CHANGE UP
SP GR SPEC: 1.01 — SIGNIFICANT CHANGE UP (ref 1–1.04)
UROBILINOGEN FLD QL: NORMAL — SIGNIFICANT CHANGE UP

## 2018-09-23 RX ADMIN — Medication 1 APPLICATION(S): at 09:08

## 2018-09-23 RX ADMIN — Medication 650 MILLIGRAM(S): at 21:22

## 2018-09-23 RX ADMIN — Medication 650 MILLIGRAM(S): at 22:00

## 2018-09-23 RX ADMIN — Medication 75 MICROGRAM(S): at 09:08

## 2018-09-23 RX ADMIN — TAMOXIFEN CITRATE 20 MILLIGRAM(S): 20 TABLET, FILM COATED ORAL at 09:09

## 2018-09-23 RX ADMIN — Medication 81 MILLIGRAM(S): at 09:08

## 2018-09-23 RX ADMIN — Medication 1 DROP(S): at 21:22

## 2018-09-23 RX ADMIN — DIVALPROEX SODIUM 1000 MILLIGRAM(S): 500 TABLET, DELAYED RELEASE ORAL at 21:22

## 2018-09-23 RX ADMIN — Medication 1 APPLICATION(S): at 21:22

## 2018-09-23 RX ADMIN — Medication 5 MILLIGRAM(S): at 21:22

## 2018-09-23 RX ADMIN — OLANZAPINE 10 MILLIGRAM(S): 15 TABLET, FILM COATED ORAL at 21:22

## 2018-09-24 PROCEDURE — 99232 SBSQ HOSP IP/OBS MODERATE 35: CPT

## 2018-09-24 RX ADMIN — DIVALPROEX SODIUM 1000 MILLIGRAM(S): 500 TABLET, DELAYED RELEASE ORAL at 20:58

## 2018-09-24 RX ADMIN — Medication 650 MILLIGRAM(S): at 12:27

## 2018-09-24 RX ADMIN — Medication 81 MILLIGRAM(S): at 08:32

## 2018-09-24 RX ADMIN — Medication 1 APPLICATION(S): at 08:33

## 2018-09-24 RX ADMIN — TAMOXIFEN CITRATE 20 MILLIGRAM(S): 20 TABLET, FILM COATED ORAL at 08:34

## 2018-09-24 RX ADMIN — OLANZAPINE 10 MILLIGRAM(S): 15 TABLET, FILM COATED ORAL at 20:59

## 2018-09-24 RX ADMIN — Medication 650 MILLIGRAM(S): at 12:41

## 2018-09-24 RX ADMIN — Medication 5 MILLIGRAM(S): at 20:59

## 2018-09-24 RX ADMIN — Medication 1 APPLICATION(S): at 21:38

## 2018-09-24 RX ADMIN — Medication 75 MICROGRAM(S): at 08:33

## 2018-09-25 LAB
ALBUMIN SERPL ELPH-MCNC: 4.1 G/DL — SIGNIFICANT CHANGE UP (ref 3.3–5)
ALP SERPL-CCNC: 67 U/L — SIGNIFICANT CHANGE UP (ref 40–120)
ALT FLD-CCNC: 44 U/L — HIGH (ref 4–33)
AST SERPL-CCNC: 39 U/L — HIGH (ref 4–32)
BASOPHILS # BLD AUTO: 0.05 K/UL — SIGNIFICANT CHANGE UP (ref 0–0.2)
BASOPHILS NFR BLD AUTO: 0.7 % — SIGNIFICANT CHANGE UP (ref 0–2)
BILIRUB DIRECT SERPL-MCNC: 0.1 MG/DL — SIGNIFICANT CHANGE UP (ref 0.1–0.2)
BILIRUB SERPL-MCNC: 0.4 MG/DL — SIGNIFICANT CHANGE UP (ref 0.2–1.2)
EOSINOPHIL # BLD AUTO: 0.26 K/UL — SIGNIFICANT CHANGE UP (ref 0–0.5)
EOSINOPHIL NFR BLD AUTO: 3.7 % — SIGNIFICANT CHANGE UP (ref 0–6)
HCT VFR BLD CALC: 38 % — SIGNIFICANT CHANGE UP (ref 34.5–45)
HGB BLD-MCNC: 12.4 G/DL — SIGNIFICANT CHANGE UP (ref 11.5–15.5)
IMM GRANULOCYTES # BLD AUTO: 0.01 # — SIGNIFICANT CHANGE UP
IMM GRANULOCYTES NFR BLD AUTO: 0.1 % — SIGNIFICANT CHANGE UP (ref 0–1.5)
LYMPHOCYTES # BLD AUTO: 3.46 K/UL — HIGH (ref 1–3.3)
LYMPHOCYTES # BLD AUTO: 49.5 % — HIGH (ref 13–44)
MCHC RBC-ENTMCNC: 30.5 PG — SIGNIFICANT CHANGE UP (ref 27–34)
MCHC RBC-ENTMCNC: 32.6 % — SIGNIFICANT CHANGE UP (ref 32–36)
MCV RBC AUTO: 93.4 FL — SIGNIFICANT CHANGE UP (ref 80–100)
MONOCYTES # BLD AUTO: 0.55 K/UL — SIGNIFICANT CHANGE UP (ref 0–0.9)
MONOCYTES NFR BLD AUTO: 7.9 % — SIGNIFICANT CHANGE UP (ref 2–14)
NEUTROPHILS # BLD AUTO: 2.66 K/UL — SIGNIFICANT CHANGE UP (ref 1.8–7.4)
NEUTROPHILS NFR BLD AUTO: 38.1 % — LOW (ref 43–77)
NRBC # FLD: 0 — SIGNIFICANT CHANGE UP
PLATELET # BLD AUTO: 263 K/UL — SIGNIFICANT CHANGE UP (ref 150–400)
PMV BLD: 10.7 FL — SIGNIFICANT CHANGE UP (ref 7–13)
PROT SERPL-MCNC: 7.3 G/DL — SIGNIFICANT CHANGE UP (ref 6–8.3)
RBC # BLD: 4.07 M/UL — SIGNIFICANT CHANGE UP (ref 3.8–5.2)
RBC # FLD: 12.9 % — SIGNIFICANT CHANGE UP (ref 10.3–14.5)
T3 SERPL-MCNC: 124.2 NG/DL — SIGNIFICANT CHANGE UP (ref 80–200)
T4 AB SER-ACNC: 7.95 UG/DL — SIGNIFICANT CHANGE UP (ref 5.1–13)
TSH SERPL-MCNC: 4.03 UIU/ML — SIGNIFICANT CHANGE UP (ref 0.27–4.2)
VALPROATE SERPL-MCNC: 58.5 UG/ML — SIGNIFICANT CHANGE UP (ref 50–100)
WBC # BLD: 6.99 K/UL — SIGNIFICANT CHANGE UP (ref 3.8–10.5)
WBC # FLD AUTO: 6.99 K/UL — SIGNIFICANT CHANGE UP (ref 3.8–10.5)

## 2018-09-25 PROCEDURE — 99232 SBSQ HOSP IP/OBS MODERATE 35: CPT

## 2018-09-25 RX ORDER — DIVALPROEX SODIUM 500 MG/1
1250 TABLET, DELAYED RELEASE ORAL AT BEDTIME
Qty: 0 | Refills: 0 | Status: DISCONTINUED | OUTPATIENT
Start: 2018-09-25 | End: 2018-10-01

## 2018-09-25 RX ADMIN — Medication 81 MILLIGRAM(S): at 09:12

## 2018-09-25 RX ADMIN — Medication 650 MILLIGRAM(S): at 20:57

## 2018-09-25 RX ADMIN — Medication 1 DROP(S): at 06:35

## 2018-09-25 RX ADMIN — TAMOXIFEN CITRATE 20 MILLIGRAM(S): 20 TABLET, FILM COATED ORAL at 09:12

## 2018-09-25 RX ADMIN — Medication 650 MILLIGRAM(S): at 21:43

## 2018-09-25 RX ADMIN — Medication 75 MICROGRAM(S): at 09:12

## 2018-09-25 RX ADMIN — Medication 1 APPLICATION(S): at 20:57

## 2018-09-25 RX ADMIN — Medication 30 MILLILITER(S): at 06:35

## 2018-09-25 RX ADMIN — OLANZAPINE 10 MILLIGRAM(S): 15 TABLET, FILM COATED ORAL at 20:57

## 2018-09-25 RX ADMIN — DIVALPROEX SODIUM 1250 MILLIGRAM(S): 500 TABLET, DELAYED RELEASE ORAL at 20:57

## 2018-09-25 RX ADMIN — Medication 5 MILLIGRAM(S): at 20:57

## 2018-09-26 PROCEDURE — 99232 SBSQ HOSP IP/OBS MODERATE 35: CPT

## 2018-09-26 RX ADMIN — TAMOXIFEN CITRATE 20 MILLIGRAM(S): 20 TABLET, FILM COATED ORAL at 08:57

## 2018-09-26 RX ADMIN — Medication 81 MILLIGRAM(S): at 08:26

## 2018-09-26 RX ADMIN — OLANZAPINE 10 MILLIGRAM(S): 15 TABLET, FILM COATED ORAL at 21:51

## 2018-09-26 RX ADMIN — Medication 1 APPLICATION(S): at 21:51

## 2018-09-26 RX ADMIN — DIVALPROEX SODIUM 1250 MILLIGRAM(S): 500 TABLET, DELAYED RELEASE ORAL at 21:51

## 2018-09-26 RX ADMIN — Medication 5 MILLIGRAM(S): at 21:51

## 2018-09-26 RX ADMIN — Medication 1 APPLICATION(S): at 08:26

## 2018-09-26 RX ADMIN — Medication 75 MICROGRAM(S): at 08:26

## 2018-09-27 LAB
ALBUMIN SERPL ELPH-MCNC: 3.9 G/DL — SIGNIFICANT CHANGE UP (ref 3.3–5)
ALP SERPL-CCNC: 67 U/L — SIGNIFICANT CHANGE UP (ref 40–120)
ALT FLD-CCNC: 32 U/L — SIGNIFICANT CHANGE UP (ref 4–33)
AST SERPL-CCNC: 32 U/L — SIGNIFICANT CHANGE UP (ref 4–32)
BILIRUB DIRECT SERPL-MCNC: 0.1 MG/DL — SIGNIFICANT CHANGE UP (ref 0.1–0.2)
BILIRUB SERPL-MCNC: 0.3 MG/DL — SIGNIFICANT CHANGE UP (ref 0.2–1.2)
PROT SERPL-MCNC: 6.7 G/DL — SIGNIFICANT CHANGE UP (ref 6–8.3)
VALPROATE SERPL-MCNC: 51.1 UG/ML — SIGNIFICANT CHANGE UP (ref 50–100)

## 2018-09-27 PROCEDURE — 99232 SBSQ HOSP IP/OBS MODERATE 35: CPT | Mod: 25

## 2018-09-27 PROCEDURE — 90853 GROUP PSYCHOTHERAPY: CPT

## 2018-09-27 RX ADMIN — Medication 81 MILLIGRAM(S): at 09:55

## 2018-09-27 RX ADMIN — OLANZAPINE 10 MILLIGRAM(S): 15 TABLET, FILM COATED ORAL at 21:42

## 2018-09-27 RX ADMIN — Medication 75 MICROGRAM(S): at 09:55

## 2018-09-27 RX ADMIN — Medication 2 MILLIGRAM(S): at 21:20

## 2018-09-27 RX ADMIN — TAMOXIFEN CITRATE 20 MILLIGRAM(S): 20 TABLET, FILM COATED ORAL at 09:55

## 2018-09-27 RX ADMIN — Medication 50 MILLIGRAM(S): at 21:20

## 2018-09-27 RX ADMIN — Medication 1 APPLICATION(S): at 21:42

## 2018-09-27 RX ADMIN — HALOPERIDOL DECANOATE 5 MILLIGRAM(S): 100 INJECTION INTRAMUSCULAR at 21:20

## 2018-09-27 RX ADMIN — Medication 1 APPLICATION(S): at 09:55

## 2018-09-27 RX ADMIN — Medication 5 MILLIGRAM(S): at 21:42

## 2018-09-27 RX ADMIN — DIVALPROEX SODIUM 1250 MILLIGRAM(S): 500 TABLET, DELAYED RELEASE ORAL at 21:42

## 2018-09-28 PROCEDURE — 99232 SBSQ HOSP IP/OBS MODERATE 35: CPT

## 2018-09-28 RX ADMIN — Medication 5 MILLIGRAM(S): at 20:46

## 2018-09-28 RX ADMIN — Medication 1 DROP(S): at 20:48

## 2018-09-28 RX ADMIN — OLANZAPINE 10 MILLIGRAM(S): 15 TABLET, FILM COATED ORAL at 20:46

## 2018-09-28 RX ADMIN — Medication 1 APPLICATION(S): at 20:47

## 2018-09-28 RX ADMIN — DIVALPROEX SODIUM 1250 MILLIGRAM(S): 500 TABLET, DELAYED RELEASE ORAL at 21:18

## 2018-09-28 RX ADMIN — Medication 81 MILLIGRAM(S): at 09:20

## 2018-09-28 RX ADMIN — Medication 1 APPLICATION(S): at 09:20

## 2018-09-28 RX ADMIN — TAMOXIFEN CITRATE 20 MILLIGRAM(S): 20 TABLET, FILM COATED ORAL at 09:20

## 2018-09-28 RX ADMIN — Medication 75 MICROGRAM(S): at 09:20

## 2018-09-28 NOTE — PHARMACOTHERAPY INTERVENTION NOTE - COMMENTS
Contacted Dr. Saeed to recommend changing Artificial Tears PF to Artificial Tears with preservatives which the pharmacy has in stock.

## 2018-09-28 NOTE — PHARMACOTHERAPY INTERVENTION NOTE - REASON FOR NOTE
Changing Artificial Tears PF to Artificial Tears with preservatives which the pharmacy has in stock.

## 2018-09-29 RX ADMIN — Medication 1 APPLICATION(S): at 20:53

## 2018-09-29 RX ADMIN — Medication 75 MICROGRAM(S): at 09:47

## 2018-09-29 RX ADMIN — TAMOXIFEN CITRATE 20 MILLIGRAM(S): 20 TABLET, FILM COATED ORAL at 09:47

## 2018-09-29 RX ADMIN — Medication 1 APPLICATION(S): at 09:47

## 2018-09-29 RX ADMIN — Medication 81 MILLIGRAM(S): at 09:47

## 2018-09-30 RX ADMIN — Medication 75 MICROGRAM(S): at 09:35

## 2018-09-30 RX ADMIN — Medication 650 MILLIGRAM(S): at 04:21

## 2018-09-30 RX ADMIN — Medication 650 MILLIGRAM(S): at 05:30

## 2018-09-30 RX ADMIN — Medication 1 APPLICATION(S): at 09:35

## 2018-09-30 RX ADMIN — Medication 81 MILLIGRAM(S): at 09:35

## 2018-09-30 RX ADMIN — TAMOXIFEN CITRATE 20 MILLIGRAM(S): 20 TABLET, FILM COATED ORAL at 09:43

## 2018-10-01 ENCOUNTER — OUTPATIENT (OUTPATIENT)
Dept: OUTPATIENT SERVICES | Facility: HOSPITAL | Age: 58
LOS: 1 days | End: 2018-10-01

## 2018-10-01 DIAGNOSIS — Z98.890 OTHER SPECIFIED POSTPROCEDURAL STATES: Chronic | ICD-10-CM

## 2018-10-01 PROCEDURE — 99232 SBSQ HOSP IP/OBS MODERATE 35: CPT

## 2018-10-01 RX ORDER — DIVALPROEX SODIUM 500 MG/1
500 TABLET, DELAYED RELEASE ORAL
Qty: 0 | Refills: 0 | Status: DISCONTINUED | OUTPATIENT
Start: 2018-10-01 | End: 2018-10-05

## 2018-10-01 RX ADMIN — DIVALPROEX SODIUM 500 MILLIGRAM(S): 500 TABLET, DELAYED RELEASE ORAL at 21:35

## 2018-10-01 RX ADMIN — Medication 75 MICROGRAM(S): at 08:40

## 2018-10-01 RX ADMIN — Medication 650 MILLIGRAM(S): at 22:10

## 2018-10-01 RX ADMIN — Medication 1 APPLICATION(S): at 08:40

## 2018-10-01 RX ADMIN — OLANZAPINE 10 MILLIGRAM(S): 15 TABLET, FILM COATED ORAL at 21:36

## 2018-10-01 RX ADMIN — Medication 650 MILLIGRAM(S): at 21:36

## 2018-10-01 RX ADMIN — Medication 30 MILLILITER(S): at 02:41

## 2018-10-01 RX ADMIN — Medication 81 MILLIGRAM(S): at 08:40

## 2018-10-01 RX ADMIN — Medication 5 MILLIGRAM(S): at 21:35

## 2018-10-01 RX ADMIN — Medication 1 APPLICATION(S): at 21:35

## 2018-10-01 RX ADMIN — TAMOXIFEN CITRATE 20 MILLIGRAM(S): 20 TABLET, FILM COATED ORAL at 08:40

## 2018-10-02 PROCEDURE — 99232 SBSQ HOSP IP/OBS MODERATE 35: CPT

## 2018-10-02 RX ADMIN — Medication 1 APPLICATION(S): at 21:07

## 2018-10-02 RX ADMIN — Medication 81 MILLIGRAM(S): at 11:33

## 2018-10-02 RX ADMIN — TAMOXIFEN CITRATE 20 MILLIGRAM(S): 20 TABLET, FILM COATED ORAL at 11:33

## 2018-10-02 RX ADMIN — Medication 75 MICROGRAM(S): at 11:33

## 2018-10-02 RX ADMIN — DIVALPROEX SODIUM 500 MILLIGRAM(S): 500 TABLET, DELAYED RELEASE ORAL at 11:33

## 2018-10-02 RX ADMIN — Medication 650 MILLIGRAM(S): at 22:00

## 2018-10-02 RX ADMIN — Medication 650 MILLIGRAM(S): at 20:26

## 2018-10-02 RX ADMIN — Medication 5 MILLIGRAM(S): at 21:07

## 2018-10-02 RX ADMIN — OLANZAPINE 10 MILLIGRAM(S): 15 TABLET, FILM COATED ORAL at 21:07

## 2018-10-02 RX ADMIN — DIVALPROEX SODIUM 500 MILLIGRAM(S): 500 TABLET, DELAYED RELEASE ORAL at 21:07

## 2018-10-03 LAB — VALPROATE SERPL-MCNC: 32.1 UG/ML — LOW (ref 50–100)

## 2018-10-03 PROCEDURE — 99232 SBSQ HOSP IP/OBS MODERATE 35: CPT

## 2018-10-03 RX ADMIN — DIVALPROEX SODIUM 500 MILLIGRAM(S): 500 TABLET, DELAYED RELEASE ORAL at 09:01

## 2018-10-03 RX ADMIN — Medication 75 MICROGRAM(S): at 09:02

## 2018-10-03 RX ADMIN — Medication 81 MILLIGRAM(S): at 09:01

## 2018-10-03 RX ADMIN — Medication 1 APPLICATION(S): at 20:46

## 2018-10-03 RX ADMIN — Medication 5 MILLIGRAM(S): at 20:38

## 2018-10-03 RX ADMIN — Medication 1 DROP(S): at 20:47

## 2018-10-03 RX ADMIN — DIVALPROEX SODIUM 500 MILLIGRAM(S): 500 TABLET, DELAYED RELEASE ORAL at 20:38

## 2018-10-03 RX ADMIN — OLANZAPINE 10 MILLIGRAM(S): 15 TABLET, FILM COATED ORAL at 20:38

## 2018-10-03 RX ADMIN — Medication 650 MILLIGRAM(S): at 10:47

## 2018-10-03 RX ADMIN — Medication 1 APPLICATION(S): at 09:01

## 2018-10-03 RX ADMIN — TAMOXIFEN CITRATE 20 MILLIGRAM(S): 20 TABLET, FILM COATED ORAL at 09:02

## 2018-10-04 PROCEDURE — 99232 SBSQ HOSP IP/OBS MODERATE 35: CPT

## 2018-10-04 RX ORDER — LEVOTHYROXINE SODIUM 125 MCG
1 TABLET ORAL
Qty: 30 | Refills: 0 | OUTPATIENT
Start: 2018-10-04 | End: 2018-11-02

## 2018-10-04 RX ORDER — DIVALPROEX SODIUM 500 MG/1
1 TABLET, DELAYED RELEASE ORAL
Qty: 60 | Refills: 0 | OUTPATIENT
Start: 2018-10-04 | End: 2018-11-02

## 2018-10-04 RX ORDER — OLANZAPINE 15 MG/1
1 TABLET, FILM COATED ORAL
Qty: 30 | Refills: 0 | OUTPATIENT
Start: 2018-10-04 | End: 2018-11-02

## 2018-10-04 RX ORDER — LANOLIN ALCOHOL/MO/W.PET/CERES
1 CREAM (GRAM) TOPICAL
Qty: 30 | Refills: 0 | OUTPATIENT
Start: 2018-10-04 | End: 2018-11-02

## 2018-10-04 RX ORDER — ASPIRIN/CALCIUM CARB/MAGNESIUM 324 MG
1 TABLET ORAL
Qty: 30 | Refills: 0 | OUTPATIENT
Start: 2018-10-04 | End: 2018-11-02

## 2018-10-04 RX ADMIN — Medication 650 MILLIGRAM(S): at 14:59

## 2018-10-04 RX ADMIN — Medication 75 MICROGRAM(S): at 08:41

## 2018-10-04 RX ADMIN — Medication 1 APPLICATION(S): at 20:50

## 2018-10-04 RX ADMIN — DIVALPROEX SODIUM 500 MILLIGRAM(S): 500 TABLET, DELAYED RELEASE ORAL at 20:50

## 2018-10-04 RX ADMIN — Medication 5 MILLIGRAM(S): at 20:50

## 2018-10-04 RX ADMIN — Medication 1 APPLICATION(S): at 08:41

## 2018-10-04 RX ADMIN — Medication 81 MILLIGRAM(S): at 08:41

## 2018-10-04 RX ADMIN — OLANZAPINE 10 MILLIGRAM(S): 15 TABLET, FILM COATED ORAL at 20:50

## 2018-10-04 RX ADMIN — TAMOXIFEN CITRATE 20 MILLIGRAM(S): 20 TABLET, FILM COATED ORAL at 08:41

## 2018-10-04 RX ADMIN — DIVALPROEX SODIUM 500 MILLIGRAM(S): 500 TABLET, DELAYED RELEASE ORAL at 08:41

## 2018-10-05 VITALS — TEMPERATURE: 98 F | RESPIRATION RATE: 16 BRPM

## 2018-10-05 LAB
BASOPHILS # BLD AUTO: 0.04 K/UL — SIGNIFICANT CHANGE UP (ref 0–0.2)
BASOPHILS NFR BLD AUTO: 0.4 % — SIGNIFICANT CHANGE UP (ref 0–2)
EOSINOPHIL # BLD AUTO: 0.35 K/UL — SIGNIFICANT CHANGE UP (ref 0–0.5)
EOSINOPHIL NFR BLD AUTO: 3.8 % — SIGNIFICANT CHANGE UP (ref 0–6)
HCT VFR BLD CALC: 38.3 % — SIGNIFICANT CHANGE UP (ref 34.5–45)
HGB BLD-MCNC: 12.3 G/DL — SIGNIFICANT CHANGE UP (ref 11.5–15.5)
IMM GRANULOCYTES # BLD AUTO: 0.03 # — SIGNIFICANT CHANGE UP
IMM GRANULOCYTES NFR BLD AUTO: 0.3 % — SIGNIFICANT CHANGE UP (ref 0–1.5)
LYMPHOCYTES # BLD AUTO: 3.61 K/UL — HIGH (ref 1–3.3)
LYMPHOCYTES # BLD AUTO: 39.6 % — SIGNIFICANT CHANGE UP (ref 13–44)
MCHC RBC-ENTMCNC: 30.1 PG — SIGNIFICANT CHANGE UP (ref 27–34)
MCHC RBC-ENTMCNC: 32.1 % — SIGNIFICANT CHANGE UP (ref 32–36)
MCV RBC AUTO: 93.6 FL — SIGNIFICANT CHANGE UP (ref 80–100)
MONOCYTES # BLD AUTO: 0.56 K/UL — SIGNIFICANT CHANGE UP (ref 0–0.9)
MONOCYTES NFR BLD AUTO: 6.1 % — SIGNIFICANT CHANGE UP (ref 2–14)
NEUTROPHILS # BLD AUTO: 4.53 K/UL — SIGNIFICANT CHANGE UP (ref 1.8–7.4)
NEUTROPHILS NFR BLD AUTO: 49.8 % — SIGNIFICANT CHANGE UP (ref 43–77)
NRBC # FLD: 0 — SIGNIFICANT CHANGE UP
PLATELET # BLD AUTO: 301 K/UL — SIGNIFICANT CHANGE UP (ref 150–400)
PMV BLD: 10.3 FL — SIGNIFICANT CHANGE UP (ref 7–13)
RBC # BLD: 4.09 M/UL — SIGNIFICANT CHANGE UP (ref 3.8–5.2)
RBC # FLD: 12.7 % — SIGNIFICANT CHANGE UP (ref 10.3–14.5)
VALPROATE SERPL-MCNC: 31.4 UG/ML — LOW (ref 50–100)
WBC # BLD: 9.12 K/UL — SIGNIFICANT CHANGE UP (ref 3.8–10.5)
WBC # FLD AUTO: 9.12 K/UL — SIGNIFICANT CHANGE UP (ref 3.8–10.5)

## 2018-10-05 PROCEDURE — 99239 HOSP IP/OBS DSCHRG MGMT >30: CPT

## 2018-10-05 RX ADMIN — TAMOXIFEN CITRATE 20 MILLIGRAM(S): 20 TABLET, FILM COATED ORAL at 09:17

## 2018-10-05 RX ADMIN — Medication 75 MICROGRAM(S): at 09:17

## 2018-10-05 RX ADMIN — Medication 81 MILLIGRAM(S): at 09:17

## 2018-10-05 RX ADMIN — Medication 1 APPLICATION(S): at 09:17

## 2018-10-05 RX ADMIN — DIVALPROEX SODIUM 500 MILLIGRAM(S): 500 TABLET, DELAYED RELEASE ORAL at 09:17

## 2018-10-11 DIAGNOSIS — Z71.89 OTHER SPECIFIED COUNSELING: ICD-10-CM

## 2018-11-01 ENCOUNTER — OUTPATIENT (OUTPATIENT)
Dept: OUTPATIENT SERVICES | Facility: HOSPITAL | Age: 58
LOS: 1 days | End: 2018-11-01
Payer: MEDICAID

## 2018-11-01 DIAGNOSIS — Z98.890 OTHER SPECIFIED POSTPROCEDURAL STATES: Chronic | ICD-10-CM

## 2018-11-26 ENCOUNTER — OUTPATIENT (OUTPATIENT)
Dept: OUTPATIENT SERVICES | Facility: HOSPITAL | Age: 58
LOS: 1 days | Discharge: HOME | End: 2018-11-26
Payer: MEDICAID

## 2018-11-26 DIAGNOSIS — Z98.890 OTHER SPECIFIED POSTPROCEDURAL STATES: Chronic | ICD-10-CM

## 2018-11-26 DIAGNOSIS — Z71.89 OTHER SPECIFIED COUNSELING: ICD-10-CM

## 2018-12-03 DIAGNOSIS — Z76.89 PERSONS ENCOUNTERING HEALTH SERVICES IN OTHER SPECIFIED CIRCUMSTANCES: ICD-10-CM

## 2019-02-01 PROCEDURE — G9005: CPT

## 2019-04-01 ENCOUNTER — OUTPATIENT (OUTPATIENT)
Dept: OUTPATIENT SERVICES | Facility: HOSPITAL | Age: 59
LOS: 1 days | End: 2019-04-01

## 2019-04-01 DIAGNOSIS — Z98.890 OTHER SPECIFIED POSTPROCEDURAL STATES: Chronic | ICD-10-CM

## 2019-04-03 NOTE — ED BEHAVIORAL HEALTH ASSESSMENT NOTE - FAMILY DETAILS
DATE OF PROCEDURE:  04/03/2019    SURGEON:  Abdon Negro MD    NEUROSURGICAL ATTENDING:  Abdon Negro MD    SURGICAL ASSISTANT:  Roxy Rajan.    PREOPERATIVE DIAGNOSES:  Lumbar degenerative disk disease with right sciatica  and right L5-S1 disk herniation.     POSTOPERATIVE DIAGNOSES:  Lumbar degenerative disk disease with right sciatica,  right L5-S1 disk herniation.     PROCEDURE PERFORMED:  Urgent right L5-S1 microdiskectomy, hemilaminotomy,  medial facetectomy.     BRIEF HISTORY:  Katie Patel is a 45-year-old female with severe back pain  and right leg pain, inability to stand or walk.  She has to lie flat in bed  with severe excruciating pain in her back and right leg, slight weakness on  plantar flexion, and radiographic evidence of a very large disk herniation at  L5-S1 with caudal migration of loose disk fragment and severe compression of S1  nerve root.  Because of her severe pain and inability to walk and because of  the large disk herniation on MRI, she was admitted for pain control and urgent  surgical decompression because she was suffering severe excruciating pain and  had slight weakness in the S1 distribution and was at risk for further pain and  suffering and further loss of neurologic function.  Surgery performed on an  urgent basis on April 3, 2019, as follows.     DESCRIPTION OF PROCEDURE:  Ms. Patel was given antibiotics, sedated, placed  under general anesthesia, intubated, carefully placed in the prone position on  a Jeffrey table.  Lumbar region prepped with alcohol, ChloraPrep, Betadine.  Midline incision infiltrated with local anesthesia.  Skin was draped in a  sterile fashion.  We cut through the skin, exposed the spine on the right side  only in the subperiosteal plane.  We confirmed localization with x-ray.  We  used a high-speed drill to complete hemilaminotomy on the right at S1 and L5.  We were able to remove the ligamentum flavum and expose the S1 nerve root.  There was a  large loose disk fragment in the epidural space and there was a  large calcified anulus fibrosis with significant disk material medially and  laterally in the epidural space affecting the S1 and L5 nerve roots.  After  careful diskectomy and thorough exploration of the disk space, there was no  instability.  There was no evidence of nerve root injury.  No evidence of dural  injury or spinal fluid leakage.  Epidural bleeding was controlled with bipolar  electrocautery.  The wound was irrigated with normal saline.  The disk  fragments were removed and then the disk space was decompressed with standard  surgical technique.  Drain was placed into the surgical exposure, tunneled out  through the skin and fascia.  We reapproximated the musculature fascia  subcutaneous tissues with Vicryl suture.  We closed the skin with Monocryl  subcuticular stitch.  Dermabond applied to the incision.  Estimated blood loss  was 50 mL.   There were no complications.  The patient awoke from anesthesia,  moving all extremities.         DATE AND TIME Abdon Negro MD      RT/JULIAN-#[id number]  DD:  04/03/2019 10:48:26      DT:  04/03/2019 17:49:44        [facility]                      MRN#: [id number]  ROOM: [id number]  ACCT#: [id number]  REPORT OF OPERATION   Son and 2 daughters

## 2019-05-01 ENCOUNTER — OUTPATIENT (OUTPATIENT)
Dept: OUTPATIENT SERVICES | Facility: HOSPITAL | Age: 59
LOS: 1 days | End: 2019-05-01
Payer: MEDICAID

## 2019-05-01 DIAGNOSIS — Z98.890 OTHER SPECIFIED POSTPROCEDURAL STATES: Chronic | ICD-10-CM

## 2019-05-17 DIAGNOSIS — Z71.89 OTHER SPECIFIED COUNSELING: ICD-10-CM

## 2019-05-28 DIAGNOSIS — Z71.89 OTHER SPECIFIED COUNSELING: ICD-10-CM

## 2019-06-04 NOTE — ED ADULT NURSE NOTE - OBJECTIVE STATEMENT
pt brought from [facility] to  s/p EMS call from pt daughter for hyper verbal, manic state, pt non-compliant w/ medications for unknown amount of time, denies SI/HI @ this time, no apparent hallucinations @ this time, labs drawn, ekg complete, awaiting CT, will continue to monitor.  (break)
(4) no impairment

## 2019-06-27 NOTE — ED PROVIDER NOTE - PROGRESS NOTE
-- Improved.

## 2019-07-29 DIAGNOSIS — Z76.89 PERSONS ENCOUNTERING HEALTH SERVICES IN OTHER SPECIFIED CIRCUMSTANCES: ICD-10-CM

## 2019-08-16 DIAGNOSIS — Z71.89 OTHER SPECIFIED COUNSELING: ICD-10-CM

## 2019-12-01 PROCEDURE — H0002: CPT

## 2020-07-24 NOTE — ED ADULT NURSE NOTE - NS_BH TRG QUESTION3_ED_ALL_ED
ANTICOAGULATION FOLLOW-UP CLINIC VISIT    Patient Name:  Cayetano Lunsford  Date:  7/24/2020  Contact Type:  Telephone    SUBJECTIVE:  Patient Findings     Positives:   Change in health (7/7/20 office visit w/PCP for medication review, all labs mostly normal with exception of elevated glucose;however, A1C good at 5.8.), Change in diet/appetite (Less greens in the past week and decreased appetite when the weather is hot. Pt will resume 3 servings per week and eat greens tonight.)        Clinical Outcomes     Negatives:   Major bleeding event, Thromboembolic event, Anticoagulation-related hospital admission, Anticoagulation-related ED visit, Anticoagulation-related fatality           OBJECTIVE    Recent labs: (last 7 days)     07/24/20  1304   INR 3.10*       ASSESSMENT / PLAN  INR assessment SUPRA    Recheck INR In: 2 WEEKS    INR Location Clinic      Anticoagulation Summary  As of 7/24/2020    INR goal:   2.0-3.0   TTR:   64.1 % (1 y)   INR used for dosing:   3.10! (7/24/2020)   Warfarin maintenance plan:   7.5 mg (5 mg x 1.5) every day   Full warfarin instructions:   7.5 mg every day   Weekly warfarin total:   52.5 mg   No change documented:   Tatyana Akers, RN   Plan last modified:   Tatyana Akers, RN (2/20/2020)   Next INR check:   8/7/2020   Priority:   High   Target end date:   Indefinite    Indications    Long-term (current) use of anticoagulants [Z79.01] [Z79.01]  Atrial fibrillation (H) [I48.91]  Longstanding persistent atrial fibrillation (H) [I48.11]             Anticoagulation Episode Summary     INR check location:       Preferred lab:       [facility] [place]    Comments:    Prefers AVS printed.       Anticoagulation Care Providers     Provider Role Specialty Phone number    Dmitri Andres MD Referring [facility] [phone]            See the Encounter Report to view Anticoagulation Flowsheet and Dosing Calendar (Go to Encounters tab in chart review, and find  the Anticoagulation Therapy Visit)        Tatyana Akers RN                  No

## 2021-01-01 ENCOUNTER — OUTPATIENT (OUTPATIENT)
Dept: OUTPATIENT SERVICES | Facility: HOSPITAL | Age: 61
LOS: 1 days | End: 2021-01-01
Payer: MEDICAID

## 2021-01-01 DIAGNOSIS — Z98.890 OTHER SPECIFIED POSTPROCEDURAL STATES: Chronic | ICD-10-CM

## 2021-01-01 PROCEDURE — H0002: CPT

## 2021-03-15 DIAGNOSIS — Z71.89 OTHER SPECIFIED COUNSELING: ICD-10-CM

## 2021-03-30 NOTE — ED PROVIDER NOTE - NS ED MD DISPO SPECIAL CONSIDERATION1
None

## 2021-05-05 ENCOUNTER — LABORATORY RESULT (OUTPATIENT)
Age: 61
End: 2021-05-05

## 2021-05-05 ENCOUNTER — APPOINTMENT (OUTPATIENT)
Dept: ENDOCRINOLOGY | Facility: CLINIC | Age: 61
End: 2021-05-05
Payer: MEDICAID

## 2021-05-05 VITALS
WEIGHT: 184 LBS | BODY MASS INDEX: 32.6 KG/M2 | SYSTOLIC BLOOD PRESSURE: 112 MMHG | HEIGHT: 63 IN | DIASTOLIC BLOOD PRESSURE: 70 MMHG

## 2021-05-05 DIAGNOSIS — Z86.59 PERSONAL HISTORY OF OTHER MENTAL AND BEHAVIORAL DISORDERS: ICD-10-CM

## 2021-05-05 PROCEDURE — 99072 ADDL SUPL MATRL&STAF TM PHE: CPT

## 2021-05-05 PROCEDURE — 99203 OFFICE O/P NEW LOW 30 MIN: CPT

## 2021-05-05 RX ORDER — TAMOXIFEN CITRATE 20 MG/1
20 TABLET, FILM COATED ORAL
Refills: 0 | Status: ACTIVE | COMMUNITY

## 2021-05-05 RX ORDER — DIVALPROEX SODIUM 500 MG/1
500 TABLET, DELAYED RELEASE ORAL
Refills: 0 | Status: ACTIVE | COMMUNITY

## 2021-05-05 RX ORDER — PROPRANOLOL HYDROCHLORIDE 10 MG/1
10 TABLET ORAL
Refills: 0 | Status: ACTIVE | COMMUNITY

## 2021-05-05 RX ORDER — OLANZAPINE 20 MG/1
20 TABLET ORAL
Refills: 0 | Status: ACTIVE | COMMUNITY

## 2021-05-05 RX ORDER — BENZTROPINE MESYLATE 0.5 MG/1
0.5 TABLET ORAL
Refills: 0 | Status: ACTIVE | COMMUNITY
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
## 2021-05-05 NOTE — ASSESSMENT
[FreeTextEntry1] : Hypothyroidism\par -Need updated TFTs, pt will do labs today at lab, will call with results/LT4 dose changes\par -For now continue LT4 88 mcg, begin to take 1st thing in AM\par -Baseline thyroid sonogram rx given for pt for ultrasound\par \par \par RTO 6 months
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
## 2021-05-06 ENCOUNTER — NON-APPOINTMENT (OUTPATIENT)
Age: 61
End: 2021-05-06

## 2021-06-22 PROCEDURE — ZZZZZ: CPT

## 2021-07-12 PROCEDURE — ZZZZZ: CPT

## 2021-11-11 ENCOUNTER — APPOINTMENT (OUTPATIENT)
Dept: ENDOCRINOLOGY | Facility: CLINIC | Age: 61
End: 2021-11-11

## 2021-12-01 PROCEDURE — G9005: CPT

## 2021-12-07 ENCOUNTER — APPOINTMENT (OUTPATIENT)
Dept: ENDOCRINOLOGY | Facility: CLINIC | Age: 61
End: 2021-12-07

## 2021-12-07 ENCOUNTER — EMERGENCY (EMERGENCY)
Facility: HOSPITAL | Age: 61
LOS: 1 days | Discharge: HOME | End: 2021-12-07
Attending: EMERGENCY MEDICINE
Payer: MEDICAID

## 2021-12-07 VITALS
RESPIRATION RATE: 16 BRPM | TEMPERATURE: 101 F | HEART RATE: 114 BPM | OXYGEN SATURATION: 96 % | SYSTOLIC BLOOD PRESSURE: 132 MMHG | DIASTOLIC BLOOD PRESSURE: 86 MMHG

## 2021-12-07 VITALS
HEART RATE: 118 BPM | HEIGHT: 63 IN | OXYGEN SATURATION: 95 % | RESPIRATION RATE: 18 BRPM | TEMPERATURE: 100 F | SYSTOLIC BLOOD PRESSURE: 119 MMHG | DIASTOLIC BLOOD PRESSURE: 72 MMHG | WEIGHT: 179.9 LBS

## 2021-12-07 DIAGNOSIS — Z98.890 OTHER SPECIFIED POSTPROCEDURAL STATES: Chronic | ICD-10-CM

## 2021-12-07 LAB
ALBUMIN SERPL ELPH-MCNC: 3 G/DL — LOW (ref 3.5–5)
ALP SERPL-CCNC: 74 U/L — SIGNIFICANT CHANGE UP (ref 40–120)
ALT FLD-CCNC: 41 U/L DA — SIGNIFICANT CHANGE UP (ref 10–60)
ANION GAP SERPL CALC-SCNC: 6 MMOL/L — SIGNIFICANT CHANGE UP (ref 5–17)
APTT BLD: 26.9 SEC — LOW (ref 27.5–35.5)
AST SERPL-CCNC: 64 U/L — HIGH (ref 10–40)
BASOPHILS # BLD AUTO: 0.05 K/UL — SIGNIFICANT CHANGE UP (ref 0–0.2)
BASOPHILS NFR BLD AUTO: 0.5 % — SIGNIFICANT CHANGE UP (ref 0–2)
BILIRUB SERPL-MCNC: 0.6 MG/DL — SIGNIFICANT CHANGE UP (ref 0.2–1.2)
BUN SERPL-MCNC: 16 MG/DL — SIGNIFICANT CHANGE UP (ref 7–18)
CALCIUM SERPL-MCNC: 9.1 MG/DL — SIGNIFICANT CHANGE UP (ref 8.4–10.5)
CHLORIDE SERPL-SCNC: 107 MMOL/L — SIGNIFICANT CHANGE UP (ref 96–108)
CO2 SERPL-SCNC: 25 MMOL/L — SIGNIFICANT CHANGE UP (ref 22–31)
CREAT SERPL-MCNC: 0.77 MG/DL — SIGNIFICANT CHANGE UP (ref 0.5–1.3)
EOSINOPHIL # BLD AUTO: 0.02 K/UL — SIGNIFICANT CHANGE UP (ref 0–0.5)
EOSINOPHIL NFR BLD AUTO: 0.2 % — SIGNIFICANT CHANGE UP (ref 0–6)
GLUCOSE SERPL-MCNC: 105 MG/DL — HIGH (ref 70–99)
HCT VFR BLD CALC: 35.3 % — SIGNIFICANT CHANGE UP (ref 34.5–45)
HGB BLD-MCNC: 11.5 G/DL — SIGNIFICANT CHANGE UP (ref 11.5–15.5)
IMM GRANULOCYTES NFR BLD AUTO: 0.7 % — SIGNIFICANT CHANGE UP (ref 0–1.5)
INR BLD: 1.12 RATIO — SIGNIFICANT CHANGE UP (ref 0.88–1.16)
LACTATE SERPL-SCNC: 1.6 MMOL/L — SIGNIFICANT CHANGE UP (ref 0.7–2)
LYMPHOCYTES # BLD AUTO: 2.19 K/UL — SIGNIFICANT CHANGE UP (ref 1–3.3)
LYMPHOCYTES # BLD AUTO: 23.4 % — SIGNIFICANT CHANGE UP (ref 13–44)
MCHC RBC-ENTMCNC: 31.2 PG — SIGNIFICANT CHANGE UP (ref 27–34)
MCHC RBC-ENTMCNC: 32.6 GM/DL — SIGNIFICANT CHANGE UP (ref 32–36)
MCV RBC AUTO: 95.7 FL — SIGNIFICANT CHANGE UP (ref 80–100)
MONOCYTES # BLD AUTO: 0.82 K/UL — SIGNIFICANT CHANGE UP (ref 0–0.9)
MONOCYTES NFR BLD AUTO: 8.8 % — SIGNIFICANT CHANGE UP (ref 2–14)
NEUTROPHILS # BLD AUTO: 6.22 K/UL — SIGNIFICANT CHANGE UP (ref 1.8–7.4)
NEUTROPHILS NFR BLD AUTO: 66.4 % — SIGNIFICANT CHANGE UP (ref 43–77)
NRBC # BLD: 0 /100 WBCS — SIGNIFICANT CHANGE UP (ref 0–0)
PLATELET # BLD AUTO: 164 K/UL — SIGNIFICANT CHANGE UP (ref 150–400)
POTASSIUM SERPL-MCNC: 4.9 MMOL/L — SIGNIFICANT CHANGE UP (ref 3.5–5.3)
POTASSIUM SERPL-SCNC: 4.9 MMOL/L — SIGNIFICANT CHANGE UP (ref 3.5–5.3)
PROT SERPL-MCNC: 7.8 G/DL — SIGNIFICANT CHANGE UP (ref 6–8.3)
PROTHROM AB SERPL-ACNC: 13.2 SEC — SIGNIFICANT CHANGE UP (ref 10.6–13.6)
RBC # BLD: 3.69 M/UL — LOW (ref 3.8–5.2)
RBC # FLD: 13.3 % — SIGNIFICANT CHANGE UP (ref 10.3–14.5)
SODIUM SERPL-SCNC: 138 MMOL/L — SIGNIFICANT CHANGE UP (ref 135–145)
WBC # BLD: 9.37 K/UL — SIGNIFICANT CHANGE UP (ref 3.8–10.5)
WBC # FLD AUTO: 9.37 K/UL — SIGNIFICANT CHANGE UP (ref 3.8–10.5)

## 2021-12-07 PROCEDURE — 72125 CT NECK SPINE W/O DYE: CPT | Mod: 26,MA

## 2021-12-07 PROCEDURE — 70450 CT HEAD/BRAIN W/O DYE: CPT | Mod: 26,MA

## 2021-12-07 PROCEDURE — 93010 ELECTROCARDIOGRAM REPORT: CPT

## 2021-12-07 PROCEDURE — 99285 EMERGENCY DEPT VISIT HI MDM: CPT

## 2021-12-07 RX ORDER — PIPERACILLIN AND TAZOBACTAM 4; .5 G/20ML; G/20ML
3.38 INJECTION, POWDER, LYOPHILIZED, FOR SOLUTION INTRAVENOUS ONCE
Refills: 0 | Status: COMPLETED | OUTPATIENT
Start: 2021-12-07 | End: 2021-12-07

## 2021-12-07 RX ORDER — SODIUM CHLORIDE 9 MG/ML
1600 INJECTION INTRAMUSCULAR; INTRAVENOUS; SUBCUTANEOUS ONCE
Refills: 0 | Status: COMPLETED | OUTPATIENT
Start: 2021-12-07 | End: 2021-12-07

## 2021-12-07 RX ADMIN — PIPERACILLIN AND TAZOBACTAM 200 GRAM(S): 4; .5 INJECTION, POWDER, LYOPHILIZED, FOR SOLUTION INTRAVENOUS at 23:24

## 2021-12-07 RX ADMIN — SODIUM CHLORIDE 1600 MILLILITER(S): 9 INJECTION INTRAMUSCULAR; INTRAVENOUS; SUBCUTANEOUS at 23:24

## 2021-12-08 LAB
APPEARANCE UR: CLEAR — SIGNIFICANT CHANGE UP
BILIRUB UR-MCNC: NEGATIVE — SIGNIFICANT CHANGE UP
COLOR SPEC: YELLOW — SIGNIFICANT CHANGE UP
DIFF PNL FLD: ABNORMAL
GLUCOSE UR QL: NEGATIVE — SIGNIFICANT CHANGE UP
KETONES UR-MCNC: ABNORMAL
LEUKOCYTE ESTERASE UR-ACNC: ABNORMAL
NITRITE UR-MCNC: NEGATIVE — SIGNIFICANT CHANGE UP
PH UR: 5 — SIGNIFICANT CHANGE UP (ref 5–8)
PROT UR-MCNC: 30 MG/DL
SP GR SPEC: 1.03 — HIGH (ref 1.01–1.02)
UROBILINOGEN FLD QL: NEGATIVE — SIGNIFICANT CHANGE UP

## 2021-12-08 PROCEDURE — 85610 PROTHROMBIN TIME: CPT

## 2021-12-08 PROCEDURE — 81001 URINALYSIS AUTO W/SCOPE: CPT

## 2021-12-08 PROCEDURE — 87086 URINE CULTURE/COLONY COUNT: CPT

## 2021-12-08 PROCEDURE — 71045 X-RAY EXAM CHEST 1 VIEW: CPT | Mod: 26

## 2021-12-08 PROCEDURE — 70450 CT HEAD/BRAIN W/O DYE: CPT | Mod: MA

## 2021-12-08 PROCEDURE — 36415 COLL VENOUS BLD VENIPUNCTURE: CPT

## 2021-12-08 PROCEDURE — 99284 EMERGENCY DEPT VISIT MOD MDM: CPT | Mod: 25

## 2021-12-08 PROCEDURE — 96374 THER/PROPH/DIAG INJ IV PUSH: CPT

## 2021-12-08 PROCEDURE — 71045 X-RAY EXAM CHEST 1 VIEW: CPT

## 2021-12-08 PROCEDURE — 83605 ASSAY OF LACTIC ACID: CPT

## 2021-12-08 PROCEDURE — 85730 THROMBOPLASTIN TIME PARTIAL: CPT

## 2021-12-08 PROCEDURE — 85025 COMPLETE CBC W/AUTO DIFF WBC: CPT

## 2021-12-08 PROCEDURE — 72125 CT NECK SPINE W/O DYE: CPT | Mod: MA

## 2021-12-08 PROCEDURE — 87040 BLOOD CULTURE FOR BACTERIA: CPT

## 2021-12-08 PROCEDURE — 80053 COMPREHEN METABOLIC PANEL: CPT

## 2021-12-08 PROCEDURE — 93005 ELECTROCARDIOGRAM TRACING: CPT

## 2021-12-08 NOTE — ED PROVIDER NOTE - MUSCULOSKELETAL, MLM
Normal rate, regular rhythm, normal S1, S2 heart sounds heard. Spine nontender and appears normal, range of motion is not limited, no muscle or joint tenderness

## 2021-12-08 NOTE — ED PROVIDER NOTE - NSFOLLOWUPINSTRUCTIONS_ED_ALL_ED_FT
Fever, Adult                    A fever is an increase in your body's temperature. It often means a temperature of 100.4°F (38°C) or higher. Brief mild or moderate fevers often have no long-term effects. They often do not need treatment. Moderate or high fevers may make you feel uncomfortable. Sometimes, they can be a sign of a serious illness or disease. A fever that keeps coming back or that lasts a long time may cause you to lose water in your body (get dehydrated).  You can take your temperature with a thermometer to see if you have a fever. Temperature can change with:  •Age.      •Time of day.    •Where the thermometer is put in the body. Readings may vary when the thermometer is put:  •In the mouth (oral).      •In the butt (rectal).      •In the ear (tympanic).      •Under the arm (axillary).      •On the forehead (temporal).          Follow these instructions at home:    Medicines     •Take over-the-counter and prescription medicines only as told by your doctor. Follow the dosing instructions carefully.      •If you were prescribed an antibiotic medicine, take it as told by your doctor. Do not stop taking it even if you start to feel better.      General instructions     •Watch for any changes in your symptoms. Tell your doctor about them.      •Rest as needed.      •Drink enough fluid to keep your pee (urine) pale yellow.      •Sponge yourself or bathe with room-temperature water as needed. This helps to lower your body temperature. Do not use ice water.      • Do not use too many blankets or wear clothes that are too heavy.    •If your fever was caused by an infection that spreads from person to person (is contagious), such as a cold or the flu:  •You should stay home from work and public places for at least 24 hours after your fever is gone.      •Your fever should be gone for at least 24 hours without the need to use medicines.          Contact a doctor if:    •You throw up (vomit).      •You cannot eat or drink without throwing up.      •You have watery poop (diarrhea).      •It hurts when you pee.      •Your symptoms do not get better with treatment.      •You have new symptoms.      •You feel very weak.        Get help right away if:    •You are short of breath or have trouble breathing.      •You are dizzy or you pass out (faint).      •You feel mixed up (confused).    •You have signs of not having enough water in your body, such as:  •Dark pee, very little pee, or no pee.      •Cracked lips.      •Dry mouth.      •Sunken eyes.      •Sleepiness.      •Weakness.        •You have very bad pain in your belly (abdomen).      •You keep throwing up or having watery poop.      •You have a rash on your skin.      •Your symptoms get worse all of a sudden.        Summary    •A fever is an increase in your body's temperature. It often means a temperature of 100.4°F (38°C) or higher.      •Watch for any changes in your symptoms. Tell your doctor about them.      •Take all medicines only as told by your doctor.      • Do not go to work or other public places if your fever was caused by an illness that can spread to other people.      •Get help right away if you have signs that you do not have enough water in your body.      This information is not intended to replace advice given to you by your health care provider. Make sure you discuss any questions you have with your health care provider.      Document Revised: 06/03/2019 Document Reviewed: 06/03/2019    [employer] Patient Education © 2021 Elsevier Inc. Name: ELIANE RAMSEY : 1960   MRN: [id number]   EPI: [id number]   Class/Location: Emergency   Site of study:      ELIANE RAMSEY K - [id number]   CLINICAL INFORMATION: fall, head injury     TECHNIQUE:   1. Axial CT images were acquired through the head.   2. Axial CT images were acquired through the cervical spine.   Intravenous contrast: None   Two-dimensional reformats were generated.     COMPARISON STUDY: Head CT 2018.     FINDINGS:   CT HEAD:     There is no CT evidence of acute intracranial hemorrhage, mass effect, midline shift, or acute, large territorial infarct. The ventricles and sulci are age-appropriate in size and configuration. The basal cisterns are patent.     Mild patchy periventricular white matter hypoattenuation is nonspecific, although likely on the basis of chronic small vessel ischemic disease.     The mastoid air cells and middle ear cavities are grossly clear. The visualized paranasal sinuses are well aerated.     The calvarium and skull base are intact.     CT CERVICAL SPINE:     There is mild straightening of the cervical lordosis.   There is no evidence of an acute cervical spine fracture or traumatic malalignment.   There is no suspicious lytic or blastic lesion.   The paraspinous soft tissues are unremarkable within limits of CT scan.     Degenerative changes:   Multilevel degenerative changes, most pronounced at C5-C6 and C6-C7 where there is moderate central spinal canal stenosis.     Incidental findings:   Visualized soft tissues of the neck appear unremarkable.   Visualized upper chest appears unremarkable.     IMPRESSION:     CT HEAD: No acute intracranial hemorrhage, mass effect, or osseous fracture.     CT CERVICAL SPINE: No acute cervical spine fracture or traumatic malalignment.                      2021 12:00 AM      Fever, Adult                    A fever is an increase in your body's temperature. It often means a temperature of 100.4°F (38°C) or higher. Brief mild or moderate fevers often have no long-term effects. They often do not need treatment. Moderate or high fevers may make you feel uncomfortable. Sometimes, they can be a sign of a serious illness or disease. A fever that keeps coming back or that lasts a long time may cause you to lose water in your body (get dehydrated).  You can take your temperature with a thermometer to see if you have a fever. Temperature can change with:  •Age.      •Time of day.    •Where the thermometer is put in the body. Readings may vary when the thermometer is put:  •In the mouth (oral).      •In the butt (rectal).      •In the ear (tympanic).      •Under the arm (axillary).      •On the forehead (temporal).          Follow these instructions at home:    Medicines     •Take over-the-counter and prescription medicines only as told by your doctor. Follow the dosing instructions carefully.      •If you were prescribed an antibiotic medicine, take it as told by your doctor. Do not stop taking it even if you start to feel better.      General instructions     •Watch for any changes in your symptoms. Tell your doctor about them.      •Rest as needed.      •Drink enough fluid to keep your pee (urine) pale yellow.      •Sponge yourself or bathe with room-temperature water as needed. This helps to lower your body temperature. Do not use ice water.      • Do not use too many blankets or wear clothes that are too heavy.    •If your fever was caused by an infection that spreads from person to person (is contagious), such as a cold or the flu:  •You should stay home from work and public places for at least 24 hours after your fever is gone.      •Your fever should be gone for at least 24 hours without the need to use medicines.          Contact a doctor if:    •You throw up (vomit).      •You cannot eat or drink without throwing up.      •You have watery poop (diarrhea).      •It hurts when you pee.      •Your symptoms do not get better with treatment.      •You have new symptoms.      •You feel very weak.        Get help right away if:    •You are short of breath or have trouble breathing.      •You are dizzy or you pass out (faint).      •You feel mixed up (confused).    •You have signs of not having enough water in your body, such as:  •Dark pee, very little pee, or no pee.      •Cracked lips.      •Dry mouth.      •Sunken eyes.      •Sleepiness.      •Weakness.        •You have very bad pain in your belly (abdomen).      •You keep throwing up or having watery poop.      •You have a rash on your skin.      •Your symptoms get worse all of a sudden.        Summary    •A fever is an increase in your body's temperature. It often means a temperature of 100.4°F (38°C) or higher.      •Watch for any changes in your symptoms. Tell your doctor about them.      •Take all medicines only as told by your doctor.      • Do not go to work or other public places if your fever was caused by an illness that can spread to other people.      •Get help right away if you have signs that you do not have enough water in your body.      This information is not intended to replace advice given to you by your health care provider. Make sure you discuss any questions you have with your health care provider.      Document Revised: 2019 Document Reviewed: 2019    [employer] Patient Education ©  Elsevier Inc.

## 2021-12-08 NOTE — ED PROVIDER NOTE - OBJECTIVE STATEMENT
62 y/o woman, h/o depression, hypothyroidism, bipolar, had fall today about about 3:30 pm, with head injury and low back pain.  No LOC.  No neck pain.  Denies weakness/numbness.  She has had some mild left UE discomfort since having Pfizer Covid booster shot in recent few days.  She was unaware of any fever prior to arrival (but had fever upon triage).

## 2021-12-08 NOTE — ED PROVIDER NOTE - PATIENT PORTAL LINK FT
You can access the FollowMyHealth Patient Portal offered by [facility] by registering at the following website: [email or web address]. By joining "[employer]’s FollowMyHealth portal, you will also be able to view your health information using other applications (apps) compatible with our system.

## 2021-12-09 LAB
CULTURE RESULTS: SIGNIFICANT CHANGE UP
SPECIMEN SOURCE: SIGNIFICANT CHANGE UP

## 2022-03-05 NOTE — ED BEHAVIORAL HEALTH ASSESSMENT NOTE - NS ED BHA PLAN LEGAL STATUS
Patient assessment complete. Pt resting with eyes closed, opens eyes to verbal stimuli. Pt a/ox4. VSS. Denies complaints of pain or discomfort. Declined to void at this time. Denies snack and drink. Declines a bath at this time. 9.39

## 2022-05-11 NOTE — ED ADULT TRIAGE NOTE - ESI TRIAGE ACUITY LEVEL, MLM
"Care Management Initial Consult    General Information  Assessment completed with: Patient, Toe  Type of CM/SW Visit: Initial Assessment    Primary Care Provider verified and updated as needed: Yes   Readmission within the last 30 days: no previous admission in last 30 days      Reason for Consult: discharge planning  Advance Care Planning: Advance Care Planning Reviewed: no concerns identified          Communication Assessment  Patient's communication style: spoken language (non-English) (speaks Angela)             Cognitive  Cognitive/Neuro/Behavioral: .WDL except, orientation  Level of Consciousness: alert  Arousal Level: opens eyes spontaneously  Orientation: disoriented to, place, time, other (see comments) (Pt knew he was in the hospital, did not know name or location of hospital, nor day of the week)  Mood/Behavior: calm, cooperative, behavior appropriate to situation  Best Language: 0 - No aphasia       Living Environment:   People in home: parent(s), sibling(s) (\"father, brother\")     Current living Arrangements: house      Able to return to prior arrangements: yes       Family/Social Support:  Care provided by: parent(s), self  Provides care for: no one, unable/limited ability to care for self     Parent(s), Sibling(s), PCA          Description of Support System: Supportive, Involved    Support Assessment: Adequate family and caregiver support, Adequate social supports, Patient communicates needs well met    Current Resources:   Patient receiving home care services: No     Community Resources: County Worker,  Mental Health, [facility], PCA, Transportation Services (\"Dad is his PCA - unknown company or hours\"; Community Paramedic helps with medications; [employer] transportation)  Equipment currently used at home: walker, standard, cane, straight, wheelchair, manual (\"I mostly use the walker\")  Supplies currently used at home: None    Employment/Financial:  Employment Status: disabled   " "  Employment/ Comments: \"no  benefits\"  Financial Concerns:     Referral to Financial Worker: No       Lifestyle & Psychosocial Needs:  Social Determinants of Health     Tobacco Use: Low Risk      Smoking Tobacco Use: Never Smoker     Smokeless Tobacco Use: Never Used   Alcohol Use: Not At Risk     Frequency of Alcohol Consumption: Never     Average Number of Drinks: Not asked     Frequency of Binge Drinking: Never   Financial Resource Strain: Medium Risk     Difficulty of Paying Living Expenses: Somewhat hard   Food Insecurity: No Food Insecurity     Worried About Running Out of Food in the Last Year: Never true     Ran Out of Food in the Last Year: Never true   Transportation Needs: Unmet Transportation Needs     Lack of Transportation (Medical): Yes     Lack of Transportation (Non-Medical): No   Physical Activity: Insufficiently Active     Days of Exercise per Week: 4 days     Minutes of Exercise per Session: 10 min   Stress: No Stress Concern Present     Feeling of Stress : Only a little   Social Connections: Socially Isolated     Frequency of Communication with Friends and Family: Never     Frequency of Social Gatherings with Friends and Family: More than three times a week     Attends Jewish Services: Never     Active Member of Clubs or Organizations: No     Attends Club or Organization Meetings: Never     Marital Status: Never    Intimate Partner Violence: Not At Risk     Fear of Current or Ex-Partner: No     Emotionally Abused: No     Physically Abused: No     Sexually Abused: No   Depression: Not at risk     PHQ-2 Score: 0   Housing Stability: Low Risk      Unable to Pay for Housing in the Last Year: No     Number of Places Lived in the Last Year: 1     Unstable Housing in the Last Year: No       Functional Status:  Prior to admission patient needed assistance:   Dependent ADLs:: Ambulation-walker, Ambulation-cane, Bathing, Dressing, Grooming, Transfers  Dependent IADLs:: Cleaning, " "Cooking, Laundry, Shopping, Meal Preparation, Medication Management, Transportation  Assesssment of Functional Status: Not at baseline with ADL Functioning, Not at baseline with mobility, Not at  functional baseline    Mental Health Status:  Mental Health Status: Past Concern  Mental Health Management: Medication, Psychiatrist    Chemical Dependency Status:                Values/Beliefs:  Spiritual, Cultural Beliefs, Temple Practices, Values that affect care:                 Additional Information:  Brooke lives in a house with his dad and brother. His Father is his PCA for [employer] cares. He states, \"I don't know what company or how many hours\".    He has a cane, walker, and wheelchair. He \"uses the walker most of the time, but lately needing the wheelchair also\".    He gets help from a Community Paramedic with medications. He may need Home Care help also.    [employer] transportation at discharge.    What is Observation was given.    Josephine Granados RN      " 3

## 2022-08-11 PROCEDURE — 99214 OFFICE O/P EST MOD 30 MIN: CPT | Mod: 95

## 2022-09-30 ENCOUNTER — TRANSCRIPTION ENCOUNTER (OUTPATIENT)
Age: 62
End: 2022-09-30

## 2022-10-01 ENCOUNTER — EMERGENCY (EMERGENCY)
Facility: HOSPITAL | Age: 62
LOS: 1 days | Discharge: HOME | End: 2022-10-01
Attending: STUDENT IN AN ORGANIZED HEALTH CARE EDUCATION/TRAINING PROGRAM
Payer: MEDICAID

## 2022-10-01 ENCOUNTER — EMERGENCY (EMERGENCY)
Facility: HOSPITAL | Age: 62
LOS: 1 days | Discharge: HOME | End: 2022-10-01
Attending: EMERGENCY MEDICINE
Payer: MEDICAID

## 2022-10-01 VITALS
OXYGEN SATURATION: 95 % | HEART RATE: 99 BPM | TEMPERATURE: 101 F | DIASTOLIC BLOOD PRESSURE: 85 MMHG | SYSTOLIC BLOOD PRESSURE: 137 MMHG | WEIGHT: 182.98 LBS | RESPIRATION RATE: 20 BRPM | HEIGHT: 63.78 IN

## 2022-10-01 VITALS
RESPIRATION RATE: 18 BRPM | SYSTOLIC BLOOD PRESSURE: 110 MMHG | HEART RATE: 82 BPM | OXYGEN SATURATION: 96 % | TEMPERATURE: 98 F | DIASTOLIC BLOOD PRESSURE: 72 MMHG

## 2022-10-01 VITALS
TEMPERATURE: 100 F | OXYGEN SATURATION: 96 % | HEIGHT: 63 IN | DIASTOLIC BLOOD PRESSURE: 87 MMHG | WEIGHT: 179.9 LBS | HEART RATE: 102 BPM | SYSTOLIC BLOOD PRESSURE: 143 MMHG | RESPIRATION RATE: 22 BRPM

## 2022-10-01 DIAGNOSIS — Z98.890 OTHER SPECIFIED POSTPROCEDURAL STATES: Chronic | ICD-10-CM

## 2022-10-01 LAB
ALBUMIN SERPL ELPH-MCNC: 2.8 G/DL — LOW (ref 3.5–5)
ALBUMIN SERPL ELPH-MCNC: 3.1 G/DL — LOW (ref 3.5–5)
ALP SERPL-CCNC: 70 U/L — SIGNIFICANT CHANGE UP (ref 40–120)
ALP SERPL-CCNC: 73 U/L — SIGNIFICANT CHANGE UP (ref 40–120)
ALT FLD-CCNC: 35 U/L DA — SIGNIFICANT CHANGE UP (ref 10–60)
ALT FLD-CCNC: 37 U/L DA — SIGNIFICANT CHANGE UP (ref 10–60)
ANION GAP SERPL CALC-SCNC: 10 MMOL/L — SIGNIFICANT CHANGE UP (ref 5–17)
ANION GAP SERPL CALC-SCNC: 6 MMOL/L — SIGNIFICANT CHANGE UP (ref 5–17)
APPEARANCE UR: CLEAR — SIGNIFICANT CHANGE UP
AST SERPL-CCNC: 50 U/L — HIGH (ref 10–40)
AST SERPL-CCNC: 51 U/L — HIGH (ref 10–40)
BACTERIA # UR AUTO: ABNORMAL /HPF
BASE EXCESS BLDV CALC-SCNC: 1.2 MMOL/L — SIGNIFICANT CHANGE UP
BASOPHILS # BLD AUTO: 0.02 K/UL — SIGNIFICANT CHANGE UP (ref 0–0.2)
BASOPHILS # BLD AUTO: 0.03 K/UL — SIGNIFICANT CHANGE UP (ref 0–0.2)
BASOPHILS NFR BLD AUTO: 0.3 % — SIGNIFICANT CHANGE UP (ref 0–2)
BASOPHILS NFR BLD AUTO: 0.3 % — SIGNIFICANT CHANGE UP (ref 0–2)
BILIRUB SERPL-MCNC: 0.6 MG/DL — SIGNIFICANT CHANGE UP (ref 0.2–1.2)
BILIRUB SERPL-MCNC: 0.7 MG/DL — SIGNIFICANT CHANGE UP (ref 0.2–1.2)
BILIRUB UR-MCNC: NEGATIVE — SIGNIFICANT CHANGE UP
BLOOD GAS COMMENTS, VENOUS: SIGNIFICANT CHANGE UP
BUN SERPL-MCNC: 3 MG/DL — LOW (ref 7–18)
BUN SERPL-MCNC: 7 MG/DL — SIGNIFICANT CHANGE UP (ref 7–18)
CALCIUM SERPL-MCNC: 8.3 MG/DL — LOW (ref 8.4–10.5)
CALCIUM SERPL-MCNC: 8.6 MG/DL — SIGNIFICANT CHANGE UP (ref 8.4–10.5)
CHLORIDE SERPL-SCNC: 108 MMOL/L — SIGNIFICANT CHANGE UP (ref 96–108)
CHLORIDE SERPL-SCNC: 112 MMOL/L — HIGH (ref 96–108)
CO2 SERPL-SCNC: 24 MMOL/L — SIGNIFICANT CHANGE UP (ref 22–31)
CO2 SERPL-SCNC: 26 MMOL/L — SIGNIFICANT CHANGE UP (ref 22–31)
COLOR SPEC: YELLOW — SIGNIFICANT CHANGE UP
CREAT SERPL-MCNC: 0.66 MG/DL — SIGNIFICANT CHANGE UP (ref 0.5–1.3)
CREAT SERPL-MCNC: 0.66 MG/DL — SIGNIFICANT CHANGE UP (ref 0.5–1.3)
D DIMER BLD IA.RAPID-MCNC: 180 NG/ML DDU — SIGNIFICANT CHANGE UP
DIFF PNL FLD: ABNORMAL
EGFR: 99 ML/MIN/1.73M2 — SIGNIFICANT CHANGE UP
EGFR: 99 ML/MIN/1.73M2 — SIGNIFICANT CHANGE UP
EOSINOPHIL # BLD AUTO: 0.04 K/UL — SIGNIFICANT CHANGE UP (ref 0–0.5)
EOSINOPHIL # BLD AUTO: 0.06 K/UL — SIGNIFICANT CHANGE UP (ref 0–0.5)
EOSINOPHIL NFR BLD AUTO: 0.6 % — SIGNIFICANT CHANGE UP (ref 0–6)
EOSINOPHIL NFR BLD AUTO: 0.7 % — SIGNIFICANT CHANGE UP (ref 0–6)
EPI CELLS # UR: ABNORMAL /HPF
FLUAV AG NPH QL: SIGNIFICANT CHANGE UP
FLUBV AG NPH QL: SIGNIFICANT CHANGE UP
GLUCOSE SERPL-MCNC: 105 MG/DL — HIGH (ref 70–99)
GLUCOSE SERPL-MCNC: 116 MG/DL — HIGH (ref 70–99)
GLUCOSE UR QL: NEGATIVE — SIGNIFICANT CHANGE UP
HCO3 BLDV-SCNC: 26 MMOL/L — SIGNIFICANT CHANGE UP (ref 22–29)
HCT VFR BLD CALC: 31.5 % — LOW (ref 34.5–45)
HCT VFR BLD CALC: 32.9 % — LOW (ref 34.5–45)
HGB BLD-MCNC: 10.4 G/DL — LOW (ref 11.5–15.5)
HGB BLD-MCNC: 10.8 G/DL — LOW (ref 11.5–15.5)
HOROWITZ INDEX BLDV+IHG-RTO: 21 — SIGNIFICANT CHANGE UP
IMM GRANULOCYTES NFR BLD AUTO: 0.3 % — SIGNIFICANT CHANGE UP (ref 0–0.9)
IMM GRANULOCYTES NFR BLD AUTO: 0.3 % — SIGNIFICANT CHANGE UP (ref 0–0.9)
KETONES UR-MCNC: ABNORMAL
LACTATE SERPL-SCNC: 1.6 MMOL/L — SIGNIFICANT CHANGE UP (ref 0.7–2)
LACTATE SERPL-SCNC: 2.9 MMOL/L — HIGH (ref 0.7–2)
LEUKOCYTE ESTERASE UR-ACNC: ABNORMAL
LYMPHOCYTES # BLD AUTO: 2.51 K/UL — SIGNIFICANT CHANGE UP (ref 1–3.3)
LYMPHOCYTES # BLD AUTO: 2.97 K/UL — SIGNIFICANT CHANGE UP (ref 1–3.3)
LYMPHOCYTES # BLD AUTO: 32.2 % — SIGNIFICANT CHANGE UP (ref 13–44)
LYMPHOCYTES # BLD AUTO: 34.9 % — SIGNIFICANT CHANGE UP (ref 13–44)
MCHC RBC-ENTMCNC: 31.6 PG — SIGNIFICANT CHANGE UP (ref 27–34)
MCHC RBC-ENTMCNC: 31.6 PG — SIGNIFICANT CHANGE UP (ref 27–34)
MCHC RBC-ENTMCNC: 32.8 GM/DL — SIGNIFICANT CHANGE UP (ref 32–36)
MCHC RBC-ENTMCNC: 33 GM/DL — SIGNIFICANT CHANGE UP (ref 32–36)
MCV RBC AUTO: 95.7 FL — SIGNIFICANT CHANGE UP (ref 80–100)
MCV RBC AUTO: 96.2 FL — SIGNIFICANT CHANGE UP (ref 80–100)
MONOCYTES # BLD AUTO: 0.83 K/UL — SIGNIFICANT CHANGE UP (ref 0–0.9)
MONOCYTES # BLD AUTO: 0.92 K/UL — HIGH (ref 0–0.9)
MONOCYTES NFR BLD AUTO: 12.8 % — SIGNIFICANT CHANGE UP (ref 2–14)
MONOCYTES NFR BLD AUTO: 9 % — SIGNIFICANT CHANGE UP (ref 2–14)
NEUTROPHILS # BLD AUTO: 3.68 K/UL — SIGNIFICANT CHANGE UP (ref 1.8–7.4)
NEUTROPHILS # BLD AUTO: 5.29 K/UL — SIGNIFICANT CHANGE UP (ref 1.8–7.4)
NEUTROPHILS NFR BLD AUTO: 51.1 % — SIGNIFICANT CHANGE UP (ref 43–77)
NEUTROPHILS NFR BLD AUTO: 57.5 % — SIGNIFICANT CHANGE UP (ref 43–77)
NITRITE UR-MCNC: NEGATIVE — SIGNIFICANT CHANGE UP
NRBC # BLD: 0 /100 WBCS — SIGNIFICANT CHANGE UP (ref 0–0)
NRBC # BLD: 0 /100 WBCS — SIGNIFICANT CHANGE UP (ref 0–0)
NT-PROBNP SERPL-SCNC: 263 PG/ML — HIGH (ref 0–125)
PCO2 BLDV: 41 MMHG — SIGNIFICANT CHANGE UP (ref 39–42)
PH BLDV: 7.41 — SIGNIFICANT CHANGE UP (ref 7.32–7.43)
PH UR: 6 — SIGNIFICANT CHANGE UP (ref 5–8)
PLATELET # BLD AUTO: 135 K/UL — LOW (ref 150–400)
PLATELET # BLD AUTO: 141 K/UL — LOW (ref 150–400)
PO2 BLDV: 41 MMHG — SIGNIFICANT CHANGE UP
POTASSIUM SERPL-MCNC: 3.3 MMOL/L — LOW (ref 3.5–5.3)
POTASSIUM SERPL-MCNC: 3.3 MMOL/L — LOW (ref 3.5–5.3)
POTASSIUM SERPL-SCNC: 3.3 MMOL/L — LOW (ref 3.5–5.3)
POTASSIUM SERPL-SCNC: 3.3 MMOL/L — LOW (ref 3.5–5.3)
PROT SERPL-MCNC: 7.3 G/DL — SIGNIFICANT CHANGE UP (ref 6–8.3)
PROT SERPL-MCNC: 7.3 G/DL — SIGNIFICANT CHANGE UP (ref 6–8.3)
PROT UR-MCNC: NEGATIVE — SIGNIFICANT CHANGE UP
RBC # BLD: 3.29 M/UL — LOW (ref 3.8–5.2)
RBC # BLD: 3.42 M/UL — LOW (ref 3.8–5.2)
RBC # FLD: 13.2 % — SIGNIFICANT CHANGE UP (ref 10.3–14.5)
RBC # FLD: 13.3 % — SIGNIFICANT CHANGE UP (ref 10.3–14.5)
RBC CASTS # UR COMP ASSIST: ABNORMAL /HPF (ref 0–2)
SAO2 % BLDV: 68.4 % — SIGNIFICANT CHANGE UP
SARS-COV-2 RNA SPEC QL NAA+PROBE: DETECTED
SODIUM SERPL-SCNC: 142 MMOL/L — SIGNIFICANT CHANGE UP (ref 135–145)
SODIUM SERPL-SCNC: 144 MMOL/L — SIGNIFICANT CHANGE UP (ref 135–145)
SP GR SPEC: 1.01 — SIGNIFICANT CHANGE UP (ref 1.01–1.02)
TROPONIN I, HIGH SENSITIVITY RESULT: 4.3 NG/L — SIGNIFICANT CHANGE UP
TROPONIN I, HIGH SENSITIVITY RESULT: 6 NG/L — SIGNIFICANT CHANGE UP
UROBILINOGEN FLD QL: NEGATIVE — SIGNIFICANT CHANGE UP
WBC # BLD: 7.19 K/UL — SIGNIFICANT CHANGE UP (ref 3.8–10.5)
WBC # BLD: 9.21 K/UL — SIGNIFICANT CHANGE UP (ref 3.8–10.5)
WBC # FLD AUTO: 7.19 K/UL — SIGNIFICANT CHANGE UP (ref 3.8–10.5)
WBC # FLD AUTO: 9.21 K/UL — SIGNIFICANT CHANGE UP (ref 3.8–10.5)
WBC UR QL: SIGNIFICANT CHANGE UP /HPF (ref 0–5)

## 2022-10-01 PROCEDURE — 82803 BLOOD GASES ANY COMBINATION: CPT

## 2022-10-01 PROCEDURE — 87086 URINE CULTURE/COLONY COUNT: CPT

## 2022-10-01 PROCEDURE — 83605 ASSAY OF LACTIC ACID: CPT

## 2022-10-01 PROCEDURE — 85025 COMPLETE CBC W/AUTO DIFF WBC: CPT

## 2022-10-01 PROCEDURE — 81001 URINALYSIS AUTO W/SCOPE: CPT

## 2022-10-01 PROCEDURE — 93005 ELECTROCARDIOGRAM TRACING: CPT

## 2022-10-01 PROCEDURE — 71045 X-RAY EXAM CHEST 1 VIEW: CPT

## 2022-10-01 PROCEDURE — 99285 EMERGENCY DEPT VISIT HI MDM: CPT

## 2022-10-01 PROCEDURE — 80053 COMPREHEN METABOLIC PANEL: CPT

## 2022-10-01 PROCEDURE — 71045 X-RAY EXAM CHEST 1 VIEW: CPT | Mod: 26

## 2022-10-01 PROCEDURE — 36415 COLL VENOUS BLD VENIPUNCTURE: CPT

## 2022-10-01 PROCEDURE — 99284 EMERGENCY DEPT VISIT MOD MDM: CPT

## 2022-10-01 PROCEDURE — 85379 FIBRIN DEGRADATION QUANT: CPT

## 2022-10-01 PROCEDURE — 84484 ASSAY OF TROPONIN QUANT: CPT

## 2022-10-01 PROCEDURE — 87637 SARSCOV2&INF A&B&RSV AMP PRB: CPT

## 2022-10-01 PROCEDURE — 96374 THER/PROPH/DIAG INJ IV PUSH: CPT

## 2022-10-01 PROCEDURE — 83880 ASSAY OF NATRIURETIC PEPTIDE: CPT

## 2022-10-01 PROCEDURE — 99285 EMERGENCY DEPT VISIT HI MDM: CPT | Mod: 25

## 2022-10-01 RX ORDER — SODIUM CHLORIDE 9 MG/ML
1000 INJECTION INTRAMUSCULAR; INTRAVENOUS; SUBCUTANEOUS ONCE
Refills: 0 | Status: COMPLETED | OUTPATIENT
Start: 2022-10-01 | End: 2022-10-01

## 2022-10-01 RX ORDER — SODIUM CHLORIDE 9 MG/ML
3 INJECTION INTRAMUSCULAR; INTRAVENOUS; SUBCUTANEOUS EVERY 8 HOURS
Refills: 0 | Status: DISCONTINUED | OUTPATIENT
Start: 2022-10-01 | End: 2022-10-04

## 2022-10-01 RX ORDER — POTASSIUM CHLORIDE 20 MEQ
40 PACKET (EA) ORAL ONCE
Refills: 0 | Status: DISCONTINUED | OUTPATIENT
Start: 2022-10-01 | End: 2022-10-05

## 2022-10-01 RX ORDER — ACETAMINOPHEN 500 MG
1000 TABLET ORAL ONCE
Refills: 0 | Status: COMPLETED | OUTPATIENT
Start: 2022-10-01 | End: 2022-10-01

## 2022-10-01 RX ADMIN — Medication 400 MILLIGRAM(S): at 04:22

## 2022-10-01 RX ADMIN — SODIUM CHLORIDE 1000 MILLILITER(S): 9 INJECTION INTRAMUSCULAR; INTRAVENOUS; SUBCUTANEOUS at 04:21

## 2022-10-01 NOTE — ED PROVIDER NOTE - PATIENT PORTAL LINK FT
You can access the FollowMyHealth Patient Portal offered by [facility] by registering at the following website: [email or web address]. By joining [employer]’s FollowMyHealth portal, you will also be able to view your health information using other applications (apps) compatible with our system.

## 2022-10-01 NOTE — ED PROVIDER NOTE - CROS ED CONS ALL NEG
We can do this but am not sure exactly what they were seeking from the therapist. I could order a generic referral for strength, stamina, and balance, but am not aware if they wanted anything special or had anything specific they did not want done. Can she send us a copy of referral she has? - - -

## 2022-10-01 NOTE — ED PROVIDER NOTE - OBJECTIVE STATEMENT
62-year-old female past medical history for bipolar disorder, dyslipidemia, hypothyroid.  Patient recently diagnosed with COVID seen earlier today in ER not hypoxic discharged with instructions for supportive care and prescribed Paxil of the.  Daughter states approximately 4:30 PM at home patient complained of shortness of breath and pulse ox recorded at 93 to 94%.  No vomiting no apnea no cyanosis.  On evaluation patient is in no respiratory distress pulse ox on room air is sustained at 97 to 98% on room air.  Patient denies chest pain.

## 2022-10-01 NOTE — ED PROVIDER NOTE - CLINICAL SUMMARY MEDICAL DECISION MAKING FREE TEXT BOX
62-year-old female COVID-positive, in no current respiratory distress not hypoxic, will check repeat Trope and D-dimer and reevaluate. 62-year-old female COVID-positive, in no current respiratory distress not hypoxic, will check repeat Trop and D-dimer and reevaluate.  948a- Pt remains well appearing, Pox 98% RA. Lungs CTA b/l. Previous CXR NAPD.  D-dimer and second trop neg.  Daughter will accompany pt home and pt was prescribed Plaxovid.  Pt is well appearing, has no new complaints and able to walk with normal gait. Pt is stable for discharge and follow up with medical doctor. Pt educated on care and need for follow up. Discussed anticipatory guidance and return precautions. Questions answered. I had a detailed discussion with the patient and/or guardian regarding the historical points, exam findings, and any diagnostic results supporting the discharge diagnosis.

## 2022-10-01 NOTE — ED PROVIDER NOTE - PHYSICAL EXAMINATION
Vital Signs Reviewed: Low fever, 92-97% on RA.   GEN: Comfortable, NAD, AAOx3  HEENT: NCAT, MMM, Neck Supple. TMs normal.   RESP: CTAB, No rales/rhonchi/wheezing  CV: RRR, S1S2, No murmurs  ABD: No TTP, Soft, ND, No masses, No CVA Tenderness  Extrem/Skin: Equal pulses bilat, No cyanosis/edema/rashes  Neuro: No focal deficits

## 2022-10-01 NOTE — ED PROVIDER NOTE - NSFOLLOWUPCLINICS_GEN_ALL_ED_FT
[facility]  Internal Medicine  [address] [place] [address]  Phone: [phone]  Fax: [phone]

## 2022-10-01 NOTE — ED PROVIDER NOTE - NSICDXPASTMEDICALHX_GEN_ALL_CORE_FT
PAST MEDICAL HISTORY:  Bipolar 1 disorder     Depression     Hypothyroid       HLD (hyperlipidemia)

## 2022-10-01 NOTE — ED ADULT TRIAGE NOTE - BRAND OF COVID-19 VACCINATION
PDMP Monitoring:    Last PDMP Elicia Rainey as Reviewed formerly [facility]):  Pfizer dose 1, 2, and 3

## 2022-10-01 NOTE — ED ADULT TRIAGE NOTE - SPO2 (%)
Cardiology follow-up    . Patient seen with family after procedure. Beta-blocker increased yesterday. We will repeat limited 2D echo tomorrow to look for early recovery of LV function. This may or may not be the case and if not should see me after rehab will reassess thereafter. 96

## 2022-10-01 NOTE — ED PROVIDER NOTE - PATIENT PORTAL LINK FT
You can access the FollowMyHealth Patient Portal offered by [facility] by registering at the following website: [email or web address]. By joining [employer]’s FollowMyHealth portal, you will also be able to view your health information using other applications (apps) compatible with our system.

## 2022-10-01 NOTE — ED ADULT TRIAGE NOTE - CHIEF COMPLAINT QUOTE
C/O SOB/ RIGHT EAR PAIN AND ALSO PTS DAUGHTER STATED SHE FELL DOWN WHILE GETTING OFF THE CHAIR AND HIT HER HEAD DENIES LOC

## 2022-10-01 NOTE — ED PROVIDER NOTE - NSFOLLOWUPINSTRUCTIONS_ED_ALL_ED_FT
You were seen and evaluated in the Emergency Department for your viral upper respiratory symptoms, possibly COVID. You should self-quarantine for presumed COVID. Please see the attached COVID information packet for management of your symptoms, and more information on the next steps including your self-quarantine measures. Remaining self-quarantined is crucial to limiting the spread of the virus.  You may also refer to the CDC website for more information: https://www.cdc.gov/coronavirus/2019-ncov/if-you-are-sick/steps-when-sick.html.      - Do NOT go to work, school or public areas.  Avoid using public transportation, ride sharing or taxis.  - Wear a mask whenever you are around other people.  - Avoid sharing personal household items.  You should NOT share dishes, drinking glasses, cups, eating utensils, towels or bedding with other people or pets in your home.  After using these items they should be washed thoroughly.    - Avoid touching your face including your eyes, nose and mouth with your hands.  - Wash your hands often with soap and water for at least 20 seconds.  You can also clean your hands with an alcohol based  that contains at least 60-95% alcohol.  You should wash/clean all surfaces of your hands.  Use soap and water preferentially.    At this time your clinical evaluation and history do not demonstrate any acute, life-threatening medical conditions warranting emergent treatment.     Continue to maintain oral hydration with plenty of fluids.  You may take Tylenol (Acetaminophen) every 8 hours with food (following the instructions on the medication insert information sheet for dosing) for fever control.  Do not exceed 3000 mg in 24 hours of acetaminophen (Tylenol).      Take over the counter Miralax as needed for constipation.      Should you develop new or worsening symptoms including but not limited to chest pain, shortness of breath, abdominal pain, vomiting, diarrhea - please return to the ED for immediate evaluation.

## 2022-10-01 NOTE — ED PROVIDER NOTE - NSFOLLOWUPINSTRUCTIONS_ED_ALL_ED_FT
COVID-19      COVID-19, or coronavirus disease 2019, is a systemic infection that is caused by a novel coronavirus called SARS-CoV-2. In some people, the virus may not cause any symptoms. In others, it may cause mild or severe symptoms. Some people with severe infection develop severe disease, which may lead to acute respiratory distress syndrome and shock.      What are the causes?  The human body, showing how the coronavirus travels from the air to a person's lungs.   This illness is caused by a virus. The virus may be in the air or on surfaces as droplets or aerosols of various sizes. You may catch the virus by:  •Breathing in droplets from an infected person. Droplets can be spread by a person breathing, speaking, singing, coughing, or sneezing.      •Touching something, like a table or a doorknob, that was exposed to the virus (is contaminated) and then touching your mouth, nose, or eyes.        What increases the risk?    Risk for infection:     You are more likely to become infected with the COVID-19 virus if:  •You are within 6 ft (1.8 m) of a person with COVID-19 for 15 minutes or longer.      •You are providing care for a person who is infected with COVID-19.      •You are in close personal contact with other people. Close personal contact includes hugging, kissing, or sharing eating or drinking utensils.      Risk for serious illness due to COVID-19:    You are more likely to become seriously ill from the COVID-19 virus if:  •You have cancer.    •You have a long-term (chronic) disease, such as:  •Chronic lung disease, including pulmonary embolism, chronic obstructive pulmonary disease, and cystic fibrosis.      •Long-term disease that lowers your body's ability to fight infection (immunocompromise).      •Serious cardiac conditions, such as heart failure, coronary artery disease, or cardiomyopathy.      •Diabetes.      •Chronic kidney disease.      •Liver diseases, including cirrhosis, nonalcoholic fatty liver disease, alcoholic liver disease, or autoimmune hepatitis.        •You are obese.      •You are pregnant or recently pregnant.      •You have sickle cell disease.        What are the signs or symptoms?    Symptoms of this condition can range from mild to severe. Symptoms may appear any time from 2 to 14 days after being exposed to the virus. They include:  •Fever or chills.      •Difficulty breathing or having shortness of breath.      •Feeling tired or very tired.      •Headaches, body aches, or muscle aches.      •Runny or stuffy nose, sneezing, cough, sore throat.      •New loss of taste or smell. This is rare.      Some people may also have stomach problems, such as nausea, vomiting, or diarrhea.    Other people may not have any symptoms of COVID-19.      How is this diagnosed?  A sample being collected by swabbing the nose.   This condition may be diagnosed by testing samples to check for the COVID-19 virus. The most common tests are the PCR test and the antigen test. Tests may be done in the lab or at home. They include:  •Using a swab to take a sample of fluid from the back of your nose and throat (nasopharyngeal fluid), from your nose, or from your throat.      •Testing a sample of saliva from your mouth.      •Testing a sample of coughed-up mucus from your lungs (sputum).        How is this treated?    Treatment for COVID-19 infection depends on the severity of the condition.  •Mild symptoms can be managed at home with rest, fluids, and over-the-counter medicines.    •Serious symptoms may be treated in a hospital intensive care unit, or ICU. Treatment in the ICU may include:  •Supplemental oxygen. Extra oxygen is given through a tube in the nose, a face mask, or a sandoval.    •Medicines. You may be given medicines:  •To help your body fight off certain viruses that can cause disease (antivirals).      •To reduce inflammation and suppress the immune system (corticosteroids).      •To prevent or treat blood clots, if they develop (antithrombotics).        •Antibodies made in a lab that can restore or strengthen your body's natural immune system (monoclonal antibody).      •Positioning you to lie on your stomach (prone position). This makes it easier for oxygen to get into the lungs.      •Infection control measures.        If you are at risk for more serious illness due to COVID-19, your health care provider may prescribe a combination of two long-acting monoclonal antibodies, administered together every 6 months.      How is this prevented?    To protect yourself:   •Get vaccinated. COVID-19 vaccines are available for everyone aged 6 months and older.  •Vaccination is recommended for women who are pregnant, may become pregnant, or are breastfeeding.      •Patients who require major surgery should plan their vaccination for several days before or after the surgery.      •Talk to your health care provider about receiving experimental monoclonal antibodies. This treatment has been approved under emergency use authorization to prevent severe illness before or after you are exposed to the COVID-19 virus. You may be given monoclonal antibodies if:  •You are moderately or severely immunocompromised. This includes treatments that lower your immune response. People with immunocompromise may not develop protection against COVID-19 when they are vaccinated.      •You cannot be vaccinated. You may not receive a vaccine if you have a severe allergic reaction to the vaccine or its components.      •You are not fully vaccinated.      •You are in close contact with a person who is infected with SARS-CoV-2, or at high risk of exposure to SARS-CoV-2, in an institution in which COVID-19 is occurring.      •You are at risk of illness from new variants of the COVID-19 virus.        To protect others:     If you have symptoms of COVID-19, take steps to prevent the virus from spreading to others.  •Stay home. Leave your house only to seek medical care. Do not use public transport, if possible.      •Do not travel while you are sick.      •Wash your hands often with soap and water for at least 20 seconds. If soap and water are not available, use alcohol-based hand .      •Stay away from other members of your household. Let healthy household members care for children and pets, if possible. If you have to care for children or pets, wash your hands often and wear a mask. If possible, stay in your own room, separate from others. Use a different bathroom.      •Make sure that all people in your household wash their hands well and often.      •Cough or sneeze into a tissue or your sleeve or elbow. Do not cough or sneeze into your hand or into the air.        Where to find more information    •Centers for Disease Control and Prevention: www.cdc.gov/coronavirus      •World Health Organization: www.who.int/health-topics/coronavirus        Get help right away if:    •You have trouble breathing.      •You have pain or pressure in your chest.      •You have confusion.      •You have bluish lips and fingernails.      •You have difficulty waking from sleep.      •You have symptoms that get worse.      These symptoms may be an emergency. Get help right away. Call 911.   • Do not wait to see if the symptoms will go away.        •  Do not drive yourself to the hospital.         Summary    •COVID-19 is a respiratory infection that is caused by a virus.      •Some people with a severe COVID-19 infection develop severe disease, which may lead to acute respiratory distress syndrome and shock.      •The virus that causes COVID-19 is contagious. This means that it can spread from person to person through droplets from breathing, speaking, singing, coughing, or sneezing.      •Mild symptoms of COVID-19 can be managed at home with rest, fluids, and over-the-counter medicines.      This information is not intended to replace advice given to you by your health care provider. Make sure you discuss any questions you have with your health care provider.

## 2022-10-01 NOTE — ED PROVIDER NOTE - OBJECTIVE STATEMENT
62 year old female with a PMHx of depression, hypothyroidism, and HLD presents to the ED with complaints of 1 day of fever, cough, shortness of breath, right ear pain and disequilibrium. Patient denies chest pain, h/o heart problems or blood clots or CVA, nausea, vomiting, diarrhea, syncope, focal numbness and weakness, other recent illnesses and hospitalizations.   NKDA.

## 2022-10-01 NOTE — ED PROVIDER NOTE - PROGRESS NOTE DETAILS
Marsha-DO: pt received at sign-out, seen and evaluated at bedside.  Pt sitting comfortably in NAD. Sign-out from Dr. Tomas pending reassessment. Pt well appearing, ambulatory with steady gait, denies any SOB. Ambulatory SpO2 97% on RA. Paxlovid deferred due to potential drug interaction with home meds, pt fully vaccinated. Will DC with PMD follow up with return precautions.

## 2022-10-01 NOTE — ED PROVIDER NOTE - CLINICAL SUMMARY MEDICAL DECISION MAKING FREE TEXT BOX
Patient presents with viral symptoms and is COVID positive. Initial lactate is 2.9. Will repeat lactate. Patient stable. Will reassess.

## 2022-10-01 NOTE — ED ADULT NURSE NOTE - CAS DISCH TRANSFER METHOD
----- Message from Riky Schaefer DO sent at 4/22/2019  8:23 AM CDT -----  Can you call pt to do the below labs.  Thanks.  ----- Message -----  From: Riky Schaefer DO  Sent: 4/22/2019  To: Riky Schaefer DO    LFT and PSA.     Walking

## 2022-10-02 LAB
CULTURE RESULTS: SIGNIFICANT CHANGE UP
SPECIMEN SOURCE: SIGNIFICANT CHANGE UP

## 2022-10-27 PROCEDURE — ZZZZZ: CPT

## 2022-12-19 PROBLEM — E78.5 HYPERLIPIDEMIA, UNSPECIFIED: Chronic | Status: ACTIVE | Noted: 2022-10-06

## 2022-12-20 ENCOUNTER — APPOINTMENT (OUTPATIENT)
Dept: ENDOCRINOLOGY | Facility: CLINIC | Age: 62
End: 2022-12-20

## 2022-12-20 VITALS
HEART RATE: 110 BPM | OXYGEN SATURATION: 97 % | WEIGHT: 179 LBS | DIASTOLIC BLOOD PRESSURE: 82 MMHG | BODY MASS INDEX: 31.71 KG/M2 | HEIGHT: 63 IN | SYSTOLIC BLOOD PRESSURE: 128 MMHG

## 2022-12-20 DIAGNOSIS — E03.9 HYPOTHYROIDISM, UNSPECIFIED: ICD-10-CM

## 2022-12-20 PROCEDURE — 99214 OFFICE O/P EST MOD 30 MIN: CPT

## 2022-12-20 RX ORDER — LEVOTHYROXINE SODIUM 0.1 MG/1
100 TABLET ORAL
Qty: 30 | Refills: 0 | Status: ACTIVE | COMMUNITY

## 2022-12-20 NOTE — ASSESSMENT
[FreeTextEntry1] : Hypothyroidism\par -Need updated TFTs, pt will do labs ASAP, will call with results/LT4 dose changes\par -For now continue LT4 100 mg daily. Patient advised to take every day in the morning, on an empty stomach, and with no other medications. \par -Baseline thyroid sonogram rx given for pt for ultrasound( never did this last year)\par \par \par RTO 4/2023

## 2022-12-20 NOTE — REVIEW OF SYSTEMS
[Fatigue] : no fatigue [Recent Weight Gain (___ Lbs)] : no recent weight gain [Recent Weight Loss (___ Lbs)] : recent weight loss: [unfilled] lbs [Visual Field Defect] : no visual field defect [Blurred Vision] : no blurred vision [Dysphagia] : dysphagia [Neck Pain] : neck pain [Dysphonia] : dysphonia [Chest Pain] : no chest pain [Palpitations] : no palpitations [Shortness Of Breath] : no shortness of breath [Constipation] : no constipation [Diarrhea] : no diarrhea [Polyuria] : no polyuria [Polydipsia] : no polydipsia [FreeTextEntry4] : intermittent

## 2022-12-20 NOTE — HISTORY OF PRESENT ILLNESS
[FreeTextEntry1] : Pt lost to follow up since 5/2021\par \par Hypothyroidism\par Recently discharged from [facility]. \par Duration: 25 years ago\par \par Current TFTs\par No updated TFts\par \par Current regimen\par Levothyroxine 100 mcg daily (prescribed by another provider as we have not seen her in over a year)\par Patient advised to take every day in the morning, on an empty stomach, and with no other medications.

## 2023-02-03 PROCEDURE — 99214 OFFICE O/P EST MOD 30 MIN: CPT | Mod: 95

## 2023-04-04 PROCEDURE — 99214 OFFICE O/P EST MOD 30 MIN: CPT | Mod: 95

## 2023-04-12 ENCOUNTER — APPOINTMENT (OUTPATIENT)
Dept: ENDOCRINOLOGY | Facility: CLINIC | Age: 63
End: 2023-04-12

## 2023-04-14 NOTE — ED PROVIDER NOTE - RESPIRATORY [+], MLM
Vaccine Information Statement(s) or the Emergency Use Authorization was given today. This has been reviewed, questions answered, and verbal consent given by Patient for injection(s) and administration of Hepatitis B and Shingles.      Patient tolerated without incident. See immunization grid for documentation.   COUGH/SHORTNESS OF BREATH

## 2023-06-08 PROCEDURE — 99214 OFFICE O/P EST MOD 30 MIN: CPT | Mod: 95

## 2023-08-17 PROCEDURE — 99214 OFFICE O/P EST MOD 30 MIN: CPT | Mod: 95

## 2023-10-30 NOTE — ED ADULT NURSE NOTE - NSFALLRSKASSESASSIST_ED_ALL_ED
Left message informing patient of Rx sent.  Asked for a call back with any questions or concerns   no

## 2024-01-01 NOTE — ED ADULT TRIAGE NOTE - AS HEIGHT TYPE
Remains on BCPAP+5 and 21% fio2. No a&b's. Feeds remain q 3 hour gavage 28 mls ebm 24 mynor/oz on pump over 30 minutes. No emesis. Voiding/stooling. Critic aid applied to buttocks with each diaper change. Mom updated at bedside. Appropriate with questions. Bonding noted. Mom did skin to skin. Mom present during rounds and updated per .    actual

## 2024-01-30 PROCEDURE — 99214 OFFICE O/P EST MOD 30 MIN: CPT

## 2024-01-30 PROCEDURE — 90833 PSYTX W PT W E/M 30 MIN: CPT

## 2024-02-01 ENCOUNTER — OUTPATIENT (OUTPATIENT)
Dept: OUTPATIENT SERVICES | Facility: HOSPITAL | Age: 64
LOS: 1 days | Discharge: HOME | End: 2024-02-01
Payer: MEDICAID

## 2024-02-01 DIAGNOSIS — Z98.890 OTHER SPECIFIED POSTPROCEDURAL STATES: Chronic | ICD-10-CM

## 2024-06-05 ENCOUNTER — EMERGENCY (EMERGENCY)
Facility: HOSPITAL | Age: 64
LOS: 1 days | Discharge: HOME | End: 2024-06-05
Attending: STUDENT IN AN ORGANIZED HEALTH CARE EDUCATION/TRAINING PROGRAM
Payer: MEDICAID

## 2024-06-05 VITALS
WEIGHT: 167.55 LBS | DIASTOLIC BLOOD PRESSURE: 62 MMHG | RESPIRATION RATE: 18 BRPM | OXYGEN SATURATION: 96 % | TEMPERATURE: 97 F | HEART RATE: 68 BPM | SYSTOLIC BLOOD PRESSURE: 103 MMHG

## 2024-06-05 DIAGNOSIS — Z98.890 OTHER SPECIFIED POSTPROCEDURAL STATES: Chronic | ICD-10-CM

## 2024-06-05 LAB
ALBUMIN SERPL ELPH-MCNC: 3.6 G/DL — SIGNIFICANT CHANGE UP (ref 3.5–5)
ALP SERPL-CCNC: 123 U/L — HIGH (ref 40–120)
ALT FLD-CCNC: 43 U/L DA — SIGNIFICANT CHANGE UP (ref 10–60)
ANION GAP SERPL CALC-SCNC: 5 MMOL/L — SIGNIFICANT CHANGE UP (ref 5–17)
APPEARANCE UR: CLEAR — SIGNIFICANT CHANGE UP
APTT BLD: 24.9 SEC — SIGNIFICANT CHANGE UP (ref 24.5–35.6)
AST SERPL-CCNC: 30 U/L — SIGNIFICANT CHANGE UP (ref 10–40)
BACTERIA # UR AUTO: ABNORMAL /HPF
BASOPHILS # BLD AUTO: 0.03 K/UL — SIGNIFICANT CHANGE UP (ref 0–0.2)
BASOPHILS NFR BLD AUTO: 0.3 % — SIGNIFICANT CHANGE UP (ref 0–2)
BILIRUB SERPL-MCNC: 0.7 MG/DL — SIGNIFICANT CHANGE UP (ref 0.2–1.2)
BILIRUB UR-MCNC: ABNORMAL
BLD GP AB SCN SERPL QL: SIGNIFICANT CHANGE UP
BUN SERPL-MCNC: 12 MG/DL — SIGNIFICANT CHANGE UP (ref 7–18)
CALCIUM SERPL-MCNC: 9.4 MG/DL — SIGNIFICANT CHANGE UP (ref 8.4–10.5)
CHLORIDE SERPL-SCNC: 107 MMOL/L — SIGNIFICANT CHANGE UP (ref 96–108)
CO2 SERPL-SCNC: 30 MMOL/L — SIGNIFICANT CHANGE UP (ref 22–31)
COLOR SPEC: SIGNIFICANT CHANGE UP
COMMENT - URINE: SIGNIFICANT CHANGE UP
CREAT SERPL-MCNC: 0.78 MG/DL — SIGNIFICANT CHANGE UP (ref 0.5–1.3)
DIFF PNL FLD: NEGATIVE — SIGNIFICANT CHANGE UP
EGFR: 85 ML/MIN/1.73M2 — SIGNIFICANT CHANGE UP
EOSINOPHIL # BLD AUTO: 0.19 K/UL — SIGNIFICANT CHANGE UP (ref 0–0.5)
EOSINOPHIL NFR BLD AUTO: 1.9 % — SIGNIFICANT CHANGE UP (ref 0–6)
EPI CELLS # UR: PRESENT
GLUCOSE SERPL-MCNC: 112 MG/DL — HIGH (ref 70–99)
GLUCOSE UR QL: NEGATIVE MG/DL — SIGNIFICANT CHANGE UP
HCT VFR BLD CALC: 39.9 % — SIGNIFICANT CHANGE UP (ref 34.5–45)
HGB BLD-MCNC: 13.3 G/DL — SIGNIFICANT CHANGE UP (ref 11.5–15.5)
HIV 1 & 2 AB SERPL IA.RAPID: SIGNIFICANT CHANGE UP
IMM GRANULOCYTES NFR BLD AUTO: 0.2 % — SIGNIFICANT CHANGE UP (ref 0–0.9)
INR BLD: 1 RATIO — SIGNIFICANT CHANGE UP (ref 0.85–1.18)
KETONES UR-MCNC: ABNORMAL MG/DL
LEUKOCYTE ESTERASE UR-ACNC: ABNORMAL
LIDOCAIN IGE QN: 38 U/L — SIGNIFICANT CHANGE UP (ref 13–75)
LYMPHOCYTES # BLD AUTO: 2.97 K/UL — SIGNIFICANT CHANGE UP (ref 1–3.3)
LYMPHOCYTES # BLD AUTO: 29.1 % — SIGNIFICANT CHANGE UP (ref 13–44)
MCHC RBC-ENTMCNC: 30.6 PG — SIGNIFICANT CHANGE UP (ref 27–34)
MCHC RBC-ENTMCNC: 33.3 GM/DL — SIGNIFICANT CHANGE UP (ref 32–36)
MCV RBC AUTO: 91.9 FL — SIGNIFICANT CHANGE UP (ref 80–100)
MONOCYTES # BLD AUTO: 0.49 K/UL — SIGNIFICANT CHANGE UP (ref 0–0.9)
MONOCYTES NFR BLD AUTO: 4.8 % — SIGNIFICANT CHANGE UP (ref 2–14)
NEUTROPHILS # BLD AUTO: 6.52 K/UL — SIGNIFICANT CHANGE UP (ref 1.8–7.4)
NEUTROPHILS NFR BLD AUTO: 63.7 % — SIGNIFICANT CHANGE UP (ref 43–77)
NITRITE UR-MCNC: NEGATIVE — SIGNIFICANT CHANGE UP
NRBC # BLD: 0 /100 WBCS — SIGNIFICANT CHANGE UP (ref 0–0)
PH UR: 5 — SIGNIFICANT CHANGE UP (ref 5–8)
PLATELET # BLD AUTO: 209 K/UL — SIGNIFICANT CHANGE UP (ref 150–400)
POTASSIUM SERPL-MCNC: 4.1 MMOL/L — SIGNIFICANT CHANGE UP (ref 3.5–5.3)
POTASSIUM SERPL-SCNC: 4.1 MMOL/L — SIGNIFICANT CHANGE UP (ref 3.5–5.3)
PROT SERPL-MCNC: 8.1 G/DL — SIGNIFICANT CHANGE UP (ref 6–8.3)
PROT UR-MCNC: NEGATIVE MG/DL — SIGNIFICANT CHANGE UP
PROTHROM AB SERPL-ACNC: 11.4 SEC — SIGNIFICANT CHANGE UP (ref 9.5–13)
RBC # BLD: 4.34 M/UL — SIGNIFICANT CHANGE UP (ref 3.8–5.2)
RBC # FLD: 12.1 % — SIGNIFICANT CHANGE UP (ref 10.3–14.5)
RBC CASTS # UR COMP ASSIST: 3 /HPF — SIGNIFICANT CHANGE UP (ref 0–4)
SODIUM SERPL-SCNC: 142 MMOL/L — SIGNIFICANT CHANGE UP (ref 135–145)
SP GR SPEC: 1.02 — SIGNIFICANT CHANGE UP (ref 1–1.03)
UROBILINOGEN FLD QL: 1 MG/DL — SIGNIFICANT CHANGE UP (ref 0.2–1)
WBC # BLD: 10.22 K/UL — SIGNIFICANT CHANGE UP (ref 3.8–10.5)
WBC # FLD AUTO: 10.22 K/UL — SIGNIFICANT CHANGE UP (ref 3.8–10.5)
WBC UR QL: 5 /HPF — SIGNIFICANT CHANGE UP (ref 0–5)

## 2024-06-05 PROCEDURE — 96374 THER/PROPH/DIAG INJ IV PUSH: CPT | Mod: XU

## 2024-06-05 PROCEDURE — 99284 EMERGENCY DEPT VISIT MOD MDM: CPT | Mod: 25

## 2024-06-05 PROCEDURE — 86900 BLOOD TYPING SEROLOGIC ABO: CPT

## 2024-06-05 PROCEDURE — 81001 URINALYSIS AUTO W/SCOPE: CPT

## 2024-06-05 PROCEDURE — 85025 COMPLETE CBC W/AUTO DIFF WBC: CPT

## 2024-06-05 PROCEDURE — 80053 COMPREHEN METABOLIC PANEL: CPT

## 2024-06-05 PROCEDURE — 36415 COLL VENOUS BLD VENIPUNCTURE: CPT

## 2024-06-05 PROCEDURE — 74177 CT ABD & PELVIS W/CONTRAST: CPT | Mod: MC

## 2024-06-05 PROCEDURE — 71046 X-RAY EXAM CHEST 2 VIEWS: CPT | Mod: 26

## 2024-06-05 PROCEDURE — 83690 ASSAY OF LIPASE: CPT

## 2024-06-05 PROCEDURE — 71046 X-RAY EXAM CHEST 2 VIEWS: CPT

## 2024-06-05 PROCEDURE — 86850 RBC ANTIBODY SCREEN: CPT

## 2024-06-05 PROCEDURE — 86803 HEPATITIS C AB TEST: CPT

## 2024-06-05 PROCEDURE — 85610 PROTHROMBIN TIME: CPT

## 2024-06-05 PROCEDURE — 99285 EMERGENCY DEPT VISIT HI MDM: CPT

## 2024-06-05 PROCEDURE — 86703 HIV-1/HIV-2 1 RESULT ANTBDY: CPT

## 2024-06-05 PROCEDURE — 96375 TX/PRO/DX INJ NEW DRUG ADDON: CPT

## 2024-06-05 PROCEDURE — 74177 CT ABD & PELVIS W/CONTRAST: CPT | Mod: 26,MC

## 2024-06-05 PROCEDURE — 85730 THROMBOPLASTIN TIME PARTIAL: CPT

## 2024-06-05 PROCEDURE — 86901 BLOOD TYPING SEROLOGIC RH(D): CPT

## 2024-06-05 RX ORDER — ONDANSETRON 8 MG/1
4 TABLET, FILM COATED ORAL ONCE
Refills: 0 | Status: COMPLETED | OUTPATIENT
Start: 2024-06-05 | End: 2024-06-05

## 2024-06-05 RX ORDER — SODIUM CHLORIDE 9 MG/ML
1000 INJECTION INTRAMUSCULAR; INTRAVENOUS; SUBCUTANEOUS ONCE
Refills: 0 | Status: COMPLETED | OUTPATIENT
Start: 2024-06-05 | End: 2024-06-05

## 2024-06-05 RX ORDER — METRONIDAZOLE 500 MG
1 TABLET ORAL
Qty: 21 | Refills: 0
Start: 2024-06-05 | End: 2024-06-11

## 2024-06-05 RX ORDER — CIPROFLOXACIN LACTATE 400MG/40ML
1 VIAL (ML) INTRAVENOUS
Qty: 14 | Refills: 0
Start: 2024-06-05 | End: 2024-06-11

## 2024-06-05 RX ORDER — FAMOTIDINE 10 MG/ML
20 INJECTION INTRAVENOUS ONCE
Refills: 0 | Status: COMPLETED | OUTPATIENT
Start: 2024-06-05 | End: 2024-06-05

## 2024-06-05 RX ADMIN — SODIUM CHLORIDE 1000 MILLILITER(S): 9 INJECTION INTRAMUSCULAR; INTRAVENOUS; SUBCUTANEOUS at 12:51

## 2024-06-05 RX ADMIN — FAMOTIDINE 20 MILLIGRAM(S): 10 INJECTION INTRAVENOUS at 12:51

## 2024-06-05 RX ADMIN — ONDANSETRON 4 MILLIGRAM(S): 8 TABLET, FILM COATED ORAL at 12:51

## 2024-06-05 NOTE — ED PROVIDER NOTE - CLINICAL SUMMARY MEDICAL DECISION MAKING FREE TEXT BOX
TELE 244W/2C 63-year-old female with a past medical history of HLD, bipolar 1, hypothyroid, depression presents with 4 episodes of vomiting blood chest pain and abdominal pain that began today.  Patient denies any fevers, urinary complaints, bloody stools, shortness of breath, palpitations, blood thinner use, recent NSAID use.     Will obtain labs, urine, EKG, chest x-ray, CT abdomen pelvis to rule out anemia, ACS, colitis, diverticulitis, appendicitis, SBO.

## 2024-06-05 NOTE — ED PROVIDER NOTE - OBJECTIVE STATEMENT
63-year-old female with a past medical history of HLD, bipolar 1, hypothyroid, depression presents with 4 episodes of vomiting blood chest pain and abdominal pain that began today.  Patient denies any fevers, urinary complaints, bloody stools, shortness of breath, palpitations, blood thinner use, recent NSAID use.

## 2024-06-05 NOTE — ED PROVIDER NOTE - SKIN, MLM
Discharge planning issues Skin normal color for race, warm, dry and intact. No evidence of rash. Prophylactic measure

## 2024-06-05 NOTE — ED ADULT NURSE NOTE - CAS EDN DISCHARGE ASSESSMENT
Time Seen by Provider: 07/03/18 15:18


Chief Complaint (Nursing): High Blood Pressure





Past Medical History


Vital Signs: 





 Last Vital Signs











Temp  98.1 F   07/03/18 14:21


 


Pulse  128 H  07/03/18 14:21


 


Resp  20   07/03/18 14:21


 


BP  172/113 H  07/03/18 14:21


 


Pulse Ox  100   07/03/18 14:21














- Medical History


PMH: HTN (stopped mos 2 months ago)





- Social History


Hx Alcohol Use: No


Hx Substance Use: No





- Immunization History


Hx Tetanus Toxoid Vaccination: No


Hx Influenza Vaccination: No


Hx Pneumococcal Vaccination: No





ED Course And Treatment





- Laboratory Results


Result Diagrams: 


 07/03/18 15:58





 07/03/18 15:58


O2 Sat by Pulse Oximetry: 100





Disposition





- Disposition Alert and oriented to person, place and time

## 2024-06-05 NOTE — ED PROVIDER NOTE - PATIENT PORTAL LINK FT
You can access the FollowMyHealth Patient Portal offered by [facility] by registering at the following website: [email or web address]. By joining [facility]’s FollowMyHealth portal, you will also be able to view your health information using other applications (apps) compatible with our system.

## 2024-06-05 NOTE — ED PROVIDER NOTE - NSFOLLOWUPINSTRUCTIONS_ED_ALL_ED_FT
Follow up with a gastroenterologist within 2 weeks.     Antibiotics sent to the pharmacy for your symptoms.  Take as directed and until all of the medication is completed even if you are feeling better.    You can take Tylenol (acetaminophen) 1000 mg every 6 hours as needed for pain or fever.     If you experience any new or worsening symptoms or if you are concerned you can always come back to the emergency for a re-evaluation.

## 2024-06-05 NOTE — ED PROVIDER NOTE - NS ED MD DISPO DISCHARGE
Reason for Disposition   Second attempt to contact caller AND no contact made  Phone number verified      Protocols used: NO CONTACT OR DUPLICATE CONTACT CALL-ADULT-AH
(0) none
Home

## 2024-06-05 NOTE — ED PROVIDER NOTE - PROGRESS NOTE DETAILS
Labs nonactionable.  CT shows ileitis/colitis.  Will send antibiotics to the pharmacy and have patient follow-up with gastroenterology. Pt is well appearing walking with steady gait, stable for discharge and follow up without fail with medical doctor. I had a detailed discussion with the patient and/or guardian regarding the historical points, exam findings, and any diagnostic results supporting the discharge diagnosis. Pt educated on care and need for follow up. Strict return instructions and red flag signs and symptoms discussed with patient. Questions answered. Pt shows understanding of discharge information and agrees to follow.

## 2024-06-06 LAB
HCV AB S/CO SERPL IA: 0.1 S/CO — SIGNIFICANT CHANGE UP (ref 0–0.99)
HCV AB SERPL-IMP: SIGNIFICANT CHANGE UP

## 2024-06-09 ENCOUNTER — EMERGENCY (EMERGENCY)
Facility: HOSPITAL | Age: 64
LOS: 1 days | Discharge: HOME | End: 2024-06-09
Payer: MEDICAID

## 2024-06-09 VITALS
HEART RATE: 66 BPM | HEIGHT: 63 IN | DIASTOLIC BLOOD PRESSURE: 65 MMHG | RESPIRATION RATE: 16 BRPM | SYSTOLIC BLOOD PRESSURE: 100 MMHG | WEIGHT: 173.94 LBS | TEMPERATURE: 98 F | OXYGEN SATURATION: 96 %

## 2024-06-09 VITALS
OXYGEN SATURATION: 96 % | DIASTOLIC BLOOD PRESSURE: 80 MMHG | HEART RATE: 60 BPM | RESPIRATION RATE: 17 BRPM | SYSTOLIC BLOOD PRESSURE: 116 MMHG | TEMPERATURE: 98 F

## 2024-06-09 DIAGNOSIS — Z98.890 OTHER SPECIFIED POSTPROCEDURAL STATES: Chronic | ICD-10-CM

## 2024-06-09 LAB
ANION GAP SERPL CALC-SCNC: 5 MMOL/L — SIGNIFICANT CHANGE UP (ref 5–17)
APPEARANCE UR: ABNORMAL
BACTERIA # UR AUTO: ABNORMAL /HPF
BASOPHILS # BLD AUTO: 0.03 K/UL — SIGNIFICANT CHANGE UP (ref 0–0.2)
BASOPHILS NFR BLD AUTO: 0.4 % — SIGNIFICANT CHANGE UP (ref 0–2)
BILIRUB UR-MCNC: ABNORMAL
BUN SERPL-MCNC: 11 MG/DL — SIGNIFICANT CHANGE UP (ref 7–18)
CALCIUM SERPL-MCNC: 9 MG/DL — SIGNIFICANT CHANGE UP (ref 8.4–10.5)
CHLORIDE SERPL-SCNC: 108 MMOL/L — SIGNIFICANT CHANGE UP (ref 96–108)
CK SERPL-CCNC: 51 U/L — SIGNIFICANT CHANGE UP (ref 21–215)
CO2 SERPL-SCNC: 29 MMOL/L — SIGNIFICANT CHANGE UP (ref 22–31)
COLOR SPEC: SIGNIFICANT CHANGE UP
COMMENT - URINE: SIGNIFICANT CHANGE UP
CREAT SERPL-MCNC: 0.83 MG/DL — SIGNIFICANT CHANGE UP (ref 0.5–1.3)
DIFF PNL FLD: NEGATIVE — SIGNIFICANT CHANGE UP
EGFR: 79 ML/MIN/1.73M2 — SIGNIFICANT CHANGE UP
EOSINOPHIL # BLD AUTO: 0.22 K/UL — SIGNIFICANT CHANGE UP (ref 0–0.5)
EOSINOPHIL NFR BLD AUTO: 2.6 % — SIGNIFICANT CHANGE UP (ref 0–6)
EPI CELLS # UR: PRESENT
GLUCOSE SERPL-MCNC: 152 MG/DL — HIGH (ref 70–99)
GLUCOSE UR QL: NEGATIVE MG/DL — SIGNIFICANT CHANGE UP
HCT VFR BLD CALC: 38.8 % — SIGNIFICANT CHANGE UP (ref 34.5–45)
HGB BLD-MCNC: 13 G/DL — SIGNIFICANT CHANGE UP (ref 11.5–15.5)
IMM GRANULOCYTES NFR BLD AUTO: 0.2 % — SIGNIFICANT CHANGE UP (ref 0–0.9)
KETONES UR-MCNC: ABNORMAL MG/DL
LEUKOCYTE ESTERASE UR-ACNC: ABNORMAL
LYMPHOCYTES # BLD AUTO: 2.89 K/UL — SIGNIFICANT CHANGE UP (ref 1–3.3)
LYMPHOCYTES # BLD AUTO: 34 % — SIGNIFICANT CHANGE UP (ref 13–44)
MCHC RBC-ENTMCNC: 30.9 PG — SIGNIFICANT CHANGE UP (ref 27–34)
MCHC RBC-ENTMCNC: 33.5 GM/DL — SIGNIFICANT CHANGE UP (ref 32–36)
MCV RBC AUTO: 92.2 FL — SIGNIFICANT CHANGE UP (ref 80–100)
MONOCYTES # BLD AUTO: 0.47 K/UL — SIGNIFICANT CHANGE UP (ref 0–0.9)
MONOCYTES NFR BLD AUTO: 5.5 % — SIGNIFICANT CHANGE UP (ref 2–14)
NEUTROPHILS # BLD AUTO: 4.86 K/UL — SIGNIFICANT CHANGE UP (ref 1.8–7.4)
NEUTROPHILS NFR BLD AUTO: 57.3 % — SIGNIFICANT CHANGE UP (ref 43–77)
NITRITE UR-MCNC: POSITIVE
NRBC # BLD: 0 /100 WBCS — SIGNIFICANT CHANGE UP (ref 0–0)
PH UR: 6 — SIGNIFICANT CHANGE UP (ref 5–8)
PLATELET # BLD AUTO: 186 K/UL — SIGNIFICANT CHANGE UP (ref 150–400)
POTASSIUM SERPL-MCNC: 3.5 MMOL/L — SIGNIFICANT CHANGE UP (ref 3.5–5.3)
POTASSIUM SERPL-SCNC: 3.5 MMOL/L — SIGNIFICANT CHANGE UP (ref 3.5–5.3)
PROT UR-MCNC: NEGATIVE MG/DL — SIGNIFICANT CHANGE UP
RBC # BLD: 4.21 M/UL — SIGNIFICANT CHANGE UP (ref 3.8–5.2)
RBC # FLD: 12.2 % — SIGNIFICANT CHANGE UP (ref 10.3–14.5)
RBC CASTS # UR COMP ASSIST: 3 /HPF — SIGNIFICANT CHANGE UP (ref 0–4)
SODIUM SERPL-SCNC: 142 MMOL/L — SIGNIFICANT CHANGE UP (ref 135–145)
SP GR SPEC: 1.02 — SIGNIFICANT CHANGE UP (ref 1–1.03)
UROBILINOGEN FLD QL: 1 MG/DL — SIGNIFICANT CHANGE UP (ref 0.2–1)
WBC # BLD: 8.49 K/UL — SIGNIFICANT CHANGE UP (ref 3.8–10.5)
WBC # FLD AUTO: 8.49 K/UL — SIGNIFICANT CHANGE UP (ref 3.8–10.5)
WBC UR QL: 15 /HPF — HIGH (ref 0–5)

## 2024-06-09 PROCEDURE — 99284 EMERGENCY DEPT VISIT MOD MDM: CPT | Mod: 25

## 2024-06-09 PROCEDURE — 85025 COMPLETE CBC W/AUTO DIFF WBC: CPT

## 2024-06-09 PROCEDURE — 81001 URINALYSIS AUTO W/SCOPE: CPT

## 2024-06-09 PROCEDURE — 99284 EMERGENCY DEPT VISIT MOD MDM: CPT

## 2024-06-09 PROCEDURE — 80048 BASIC METABOLIC PNL TOTAL CA: CPT

## 2024-06-09 PROCEDURE — 36415 COLL VENOUS BLD VENIPUNCTURE: CPT

## 2024-06-09 PROCEDURE — 87086 URINE CULTURE/COLONY COUNT: CPT

## 2024-06-09 PROCEDURE — 96374 THER/PROPH/DIAG INJ IV PUSH: CPT

## 2024-06-09 PROCEDURE — 82550 ASSAY OF CK (CPK): CPT

## 2024-06-09 RX ORDER — ONDANSETRON 8 MG/1
4 TABLET, FILM COATED ORAL ONCE
Refills: 0 | Status: COMPLETED | OUTPATIENT
Start: 2024-06-09 | End: 2024-06-09

## 2024-06-09 RX ORDER — SODIUM CHLORIDE 9 MG/ML
1000 INJECTION INTRAMUSCULAR; INTRAVENOUS; SUBCUTANEOUS ONCE
Refills: 0 | Status: COMPLETED | OUTPATIENT
Start: 2024-06-09 | End: 2024-06-09

## 2024-06-09 RX ADMIN — SODIUM CHLORIDE 1000 MILLILITER(S): 9 INJECTION INTRAMUSCULAR; INTRAVENOUS; SUBCUTANEOUS at 14:11

## 2024-06-09 RX ADMIN — ONDANSETRON 4 MILLIGRAM(S): 8 TABLET, FILM COATED ORAL at 15:05

## 2024-06-09 NOTE — ED PROVIDER NOTE - PATIENT PORTAL LINK FT
You can access the FollowMyHealth Patient Portal offered by [facility] by registering at the following website: [email or web address]. By joining [facility]’s FollowMyHealth portal, you will also be able to view your health information using other applications (apps) compatible with our system.

## 2024-06-09 NOTE — ED PROVIDER NOTE - NSICDXPASTMEDICALHX_GEN_ALL_CORE_FT
PAST MEDICAL HISTORY:  Bipolar 1 disorder     Depression     HLD (hyperlipidemia)     Hypothyroid

## 2024-06-09 NOTE — ED PROVIDER NOTE - NSFOLLOWUPINSTRUCTIONS_ED_ALL_ED_FT
Follow-up with your primary care doctor in 2-3 days.    If you experience any new or worsening symptoms or if you are concerned you can always come back to the emergency for a re-evaluation.    Some results may not be available at the time of your discharge from the hospital. You can download the FOLLOW MY HEALTH alfa and have access to these results.

## 2024-06-09 NOTE — ED ADULT TRIAGE NOTE - IDEAL BODY WEIGHT(KG)
[facility] PRIMARY CARE  [address] [address] [place] [id number]  Dept: [address] Hanh Baires is a 28 y.o. female Established patient, who presents today for her medical conditions/complaints as noted below. Chief Complaint   Patient presents with    Back Pain    Flu Vaccine     Declined       HPI:     HPI  Patient states Maxalt is working for the migraines. Does not occur out. Last migraine was 2 weeks ago. Patient states able to focus better with the Adderall. Able to stay on task. 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 Denies any street drugs. No rapid beating of the heart. No skipping of the heartbeat.     Reviewed prior notes None  Reviewed previous Labs    LDL Cholesterol (mg/dL)   Date Value   11/13/2020 100       (goal LDL is <100)   BUN (mg/dL)   Date Value   11/13/2020 13     TSH (mIU/L)   Date Value   11/11/2021 0.74     BP Readings from Last 3 Encounters:   12/27/22 124/72   09/20/22 122/80   07/07/22 112/86          (goal 120/80)    Past Medical History:   Diagnosis Date    Abnormal Pap smear of cervix     2010, PER PATIENT NEVER HAD A COLPOSCOPY OR FURTHER FOLLOW UP    Anemia     WITH PREGNANCY    Anxiety     Back pain     LUMBAR SPINE    Depression       Past Surgical History:   Procedure Laterality Date    US BREAST NEEDLE BIOPSY RIGHT Right 3/10/2022    US BREAST NEEDLE BIOPSY RIGHT 3/10/2022 [address] [place] [address]       Family History   Problem Relation Age of Onset    Hypertension Mother     Other Sister         DISABLED - PT UNABLE TO LIST WHICH TYPE OF DISABILITY    Breast Cancer Maternal Aunt     Breast Cancer Paternal Aunt     Cancer Maternal Uncle         TESTICULAR       Social History     Tobacco Use    Smoking status: Former     Packs/day: 0.25     Types: Cigarettes     Start date: 1/1/2019     Quit date: 12/1/2019     Years since quitting: 3.0    Smokeless tobacco: Never   Substance Use Topics    Alcohol use: Not Currently Comment: social      Current Outpatient Medications   Medication Sig Dispense Refill    amphetamine-dextroamphetamine (ADDERALL) 20 MG tablet Take 1 tablet by mouth 2 times daily for 30 days. 60 tablet 0    rizatriptan (MAXALT) 10 MG tablet Take 1 tablet by mouth daily as needed for Migraine May repeat in 2 hours if needed 9 tablet 5    clotrimazole-betamethasone (LOTRISONE) 1-0.05 % cream Apply to affected area bid externally x 4 days then daily for 3 days 45 g 1    oxyCODONE-acetaminophen (PERCOCET) 7.5-325 MG per tablet       meloxicam (MOBIC) 15 MG tablet       melatonin 3 MG TABS tablet Take 3 mg by mouth daily       gabapentin (NEURONTIN) 600 MG tablet Take 1 tablet by mouth at bedtime for 30 days. 30 tablet 5     Current Facility-Administered Medications   Medication Dose Route Frequency Provider Last Rate Last Admin    levonorgestrel (MIRENA) IUD 52 mg 1 each  1 each IntraUTERine Once Preet Becket, DO   1 each at 11/12/21 1033     Allergies   Allergen Reactions    No Known Allergies        Health Maintenance   Topic Date Due    COVID-19 Vaccine (1) Never done    Varicella vaccine (1 of 2 - 2-dose childhood series) Never done    Hepatitis C screen  Never done    Flu vaccine (1) 08/01/2022    Depression Screen  06/01/2023    Cervical cancer screen  07/13/2027    DTaP/Tdap/Td vaccine (7 - Td or Tdap) 12/03/2029    Hib vaccine  Completed    HIV screen  Completed    Hepatitis A vaccine  Aged Out    Meningococcal (ACWY) vaccine  Aged Out    Pneumococcal 0-64 years Vaccine  Aged Out       Subjective:      Review of Systems   Constitutional:  Negative for chills and fever. HENT:  Negative for rhinorrhea and sore throat. Eyes:  Negative for discharge and redness. Respiratory:  Negative for cough, shortness of breath and wheezing. Cardiovascular:  Negative for chest pain and palpitations. Gastrointestinal:  Negative for abdominal pain, diarrhea, nausea and vomiting.    Genitourinary: Negative for dysuria and frequency. Musculoskeletal:  Negative for arthralgias and myalgias. Neurological:  Negative for dizziness, light-headedness and headaches. Psychiatric/Behavioral:  Negative for sleep disturbance. Objective:     /72   Pulse 93   Ht 5' 2.04\" (1.576 m)   Wt 127 lb 12.8 oz (58 kg)   SpO2 98%   BMI 23.34 kg/m²   Physical Exam  Vitals and nursing note reviewed. Constitutional:       General: She is not in acute distress. Appearance: She is well-developed. She is not ill-appearing. HENT:      Head: Normocephalic and atraumatic. Right Ear: External ear normal.      Left Ear: External ear normal.   Eyes:      General: No scleral icterus. Right eye: No discharge. Left eye: No discharge. Conjunctiva/sclera: Conjunctivae normal.   Neck:      Thyroid: No thyromegaly. Trachea: No tracheal deviation. Cardiovascular:      Rate and Rhythm: Normal rate and regular rhythm. Heart sounds: Normal heart sounds. Pulmonary:      Effort: Pulmonary effort is normal. No respiratory distress. Breath sounds: Normal breath sounds. No wheezing. Lymphadenopathy:      Cervical: No cervical adenopathy. Skin:     General: Skin is warm. Findings: No rash. Neurological:      Mental Status: She is alert and oriented to person, place, and time. Psychiatric:         Mood and Affect: Mood normal.         Behavior: Behavior normal.         Thought Content: Thought content normal.       Assessment:       Diagnosis Orders   1. Medication management        2. Attention deficit disorder (ADD) without hyperactivity  amphetamine-dextroamphetamine (ADDERALL) 20 MG tablet      3. Encounter for long-term (current) use of high-risk medication  amphetamine-dextroamphetamine (ADDERALL) 20 MG tablet           Plan:      Return in about 4 months (around 4/27/2023). No orders of the defined types were placed in this encounter.     Orders Placed This Encounter Medications    amphetamine-dextroamphetamine (ADDERALL) 20 MG tablet     Sig: Take 1 tablet by mouth 2 times daily for 30 days. Dispense:  60 tablet     Refill:  0       Patient given educational materials - see patient instructions. Discussed use, benefit, and side effects of prescribed medications. All patient questions answered. Pt voiced understanding. Reviewed health maintenance. Instructed to continue current medications, diet andexercise. Patient agreed with treatment plan. Follow up as directed.      Electronicallysigned by Kaylyn Allen MD on 12/27/2022 at 3:15 PM 52

## 2024-06-09 NOTE — ED PROVIDER NOTE - CLINICAL SUMMARY MEDICAL DECISION MAKING FREE TEXT BOX
63-year-old female, presents for evaluation of dark urine since yesterday.  Well-appearing, mildly hypertensive in triage, afebrile.  Unremarkable abdominal exam.  Patient asymptomatic at this time.  Based on patient's history there is suspicion of dehydration which would explain dark urine.  Plan to do labs to rule out BAR, urine, IV fluids and reassess. 63-year-old female, presents for evaluation of dark urine since yesterday.  Well-appearing, mildly hypertensive in triage, afebrile.  Unremarkable abdominal exam.  Patient asymptomatic at this time.  Based on patient's history there is suspicion of dehydration which would explain dark urine.  Plan to do labs to rule out BAR, urine, IV fluids and reassess.    15: 22–labs grossly unremarkable.  Urinalysis shows leukocytes/nitrates positive.  Patient denies any urinary symptoms and is already on Cipro Rx.  No indication for additional intervention.  No signs of BAR.  Urinalysis shows dark urine with ketones likely explaining discoloration of the urine.  At this time patient will be discharged with a PMD follow-up in 2-3 days.

## 2024-06-09 NOTE — ED PROVIDER NOTE - OBJECTIVE STATEMENT
63-year-old female, history of bipolar disorder, depression, hypothyroidism, presents for evaluation of dark urine since yesterday.  4 days ago was evaluated for chest pain/abdominal pain and vomiting.  CT showed ileitis/colitis and patient was discharged on Cipro and Flagyl.  Since her discharge from the hospital reports feeling  progressively better.  Denies any chest pain or abdominal pain.  No more vomiting and tolerating p.o. intake.  Reports not drinking much water during the day.  Denies any urinary symptoms such as dysuria, frequency, hesitancy or gross hematuria.  No other complaints.

## 2024-06-09 NOTE — ED ADULT NURSE NOTE - NSFALLUNIVINTERV_ED_ALL_ED
Called pt to discuss. He states about 1 month ago he had a cough and weakness/lightheadedness for 7-10 days. He states then it cleared. However, last Thursday it returned with cough and increased temperatures. Pt states temperatures today range from 99.5-100. Denies ST, n/v/c/d. Pt states he has chronic sinus problems so he usually has drainage from that. He states the drainage that he is having now is \"normal\" for him. Pt is already taking Mucinex OTC.     Discussed with NP. Writer was unsure if we could test for influenza if sx returned. Unable to test pt at this time. Recommend fluids, rest, delsym cough syrup for night time, Nyquil PRN, steam/humidifier and saline nasal spray.     Called pt back to discuss and offer options above. He verbalizes understanding and will try recommendations. He will call back with worsening/persisting sx. He has no further questions at this time.    Bed/Stretcher in lowest position, wheels locked, appropriate side rails in place/Call bell, personal items and telephone in reach/Instruct patient to call for assistance before getting out of bed/chair/stretcher/Non-slip footwear applied when patient is off stretcher/[place] to call system/Physically safe environment - no spills, clutter or unnecessary equipment/Purposeful proactive rounding/Room/bathroom lighting operational, light cord in reach

## 2024-06-09 NOTE — ED ADULT NURSE NOTE - OBJECTIVE STATEMENT
Pt presents to the ED with c/o dark urine x 2 days. Pt was seen 4 days ago and started on flagyl and cipro for colitis.

## 2024-06-10 LAB
CULTURE RESULTS: SIGNIFICANT CHANGE UP
SPECIMEN SOURCE: SIGNIFICANT CHANGE UP

## 2024-07-02 PROCEDURE — 99214 OFFICE O/P EST MOD 30 MIN: CPT

## 2024-07-02 PROCEDURE — 90833 PSYTX W PT W E/M 30 MIN: CPT

## 2024-09-10 PROCEDURE — 99214 OFFICE O/P EST MOD 30 MIN: CPT

## 2024-10-01 PROCEDURE — 99214 OFFICE O/P EST MOD 30 MIN: CPT

## 2024-10-15 PROCEDURE — 99214 OFFICE O/P EST MOD 30 MIN: CPT

## 2025-05-09 NOTE — ED PROVIDER NOTE - CLINICAL SUMMARY MEDICAL DECISION MAKING FREE TEXT BOX
Relevant Problems   ENDOCRINE   (+) Type 2 diabetes mellitus without complication (HCC)       Anesthetic History   No history of anesthetic complications            Review of Systems / Medical History  Patient summary reviewed, nursing notes reviewed and pertinent labs reviewed    Pulmonary  Within defined limits                 Neuro/Psych         Headaches (igraines) and psychiatric history     Cardiovascular  Within defined limits                Exercise tolerance: <4 METS: arthritis     GI/Hepatic/Renal     GERD: well controlled           Endo/Other    Diabetes: well controlled    Obesity and arthritis    Comments: S/p gastric bypass. Down 94 pounds.  Other Findings              Physical Exam    Airway  Mallampati: II  TM Distance: 4 - 6 cm  Neck ROM: normal range of motion   Mouth opening: Normal     Cardiovascular  Regular rate and rhythm,  S1 and S2 normal,  no murmur, click, rub, or gallop  Rhythm: regular  Rate: normal         Dental    Dentition: Upper partial plate     Pulmonary  Breath sounds clear to auscultation               Abdominal         Other Findings            Anesthetic Plan    ASA: 2  Anesthesia type: regional - [place] block          Induction: Intravenous  Anesthetic plan and risks discussed with: Patient and Family Returned forms to [facility]   62 y/o woman, h/o depression, hypothyroidism, bipolar, had fall today about about 3:30 pm, with head injury and low back pain.  No LOC.  No neck pain.  Denies weakness/numbness.  She was unaware of any fever prior to arrival (but had fever upon triage).  She is also tachycardic so code sepsis ordered;  CT head and C-S unremarkable and sepsis w/u mostly back at time of signout to overnight doctor, but still pending urine. 60 y/o woman, h/o depression, hypothyroidism, bipolar, had fall today about about 3:30 pm, with head injury and low back pain.  No LOC.  No neck pain.  Denies weakness/numbness.  She was unaware of any fever prior to arrival (but had fever upon triage).  She is also tachycardic so code sepsis ordered;  CT head and C-S unremarkable and sepsis w/u unremarkable; suspect viral infection, unlikely Covid as she had 2 Covid vaccines and the booster.

## 2025-06-03 PROCEDURE — 99213 OFFICE O/P EST LOW 20 MIN: CPT

## 2025-06-03 PROCEDURE — 90833 PSYTX W PT W E/M 30 MIN: CPT
